# Patient Record
Sex: FEMALE | Race: WHITE | NOT HISPANIC OR LATINO | Employment: UNEMPLOYED | URBAN - METROPOLITAN AREA
[De-identification: names, ages, dates, MRNs, and addresses within clinical notes are randomized per-mention and may not be internally consistent; named-entity substitution may affect disease eponyms.]

---
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
## 2017-09-12 ENCOUNTER — TRANSCRIBE ORDERS (OUTPATIENT)
Dept: ADMINISTRATIVE | Facility: HOSPITAL | Age: 22
End: 2017-09-12

## 2017-09-12 DIAGNOSIS — Z3A.19 19 WEEKS GESTATION OF PREGNANCY: Primary | ICD-10-CM

## 2017-09-28 ENCOUNTER — GENERIC CONVERSION - ENCOUNTER (OUTPATIENT)
Dept: OTHER | Facility: OTHER | Age: 22
End: 2017-09-28

## 2017-10-05 ENCOUNTER — GENERIC CONVERSION - ENCOUNTER (OUTPATIENT)
Dept: OTHER | Facility: OTHER | Age: 22
End: 2017-10-05

## 2017-10-06 ENCOUNTER — ALLSCRIPTS OFFICE VISIT (OUTPATIENT)
Dept: PERINATAL CARE | Facility: CLINIC | Age: 22
End: 2017-10-06
Payer: COMMERCIAL

## 2017-10-06 ENCOUNTER — GENERIC CONVERSION - ENCOUNTER (OUTPATIENT)
Dept: OTHER | Facility: OTHER | Age: 22
End: 2017-10-06

## 2017-10-06 PROCEDURE — 76805 OB US >/= 14 WKS SNGL FETUS: CPT | Performed by: OBSTETRICS & GYNECOLOGY

## 2017-10-06 PROCEDURE — 76817 TRANSVAGINAL US OBSTETRIC: CPT | Performed by: OBSTETRICS & GYNECOLOGY

## 2017-11-30 ENCOUNTER — APPOINTMENT (OUTPATIENT)
Dept: LAB | Facility: CLINIC | Age: 22
End: 2017-11-30

## 2017-11-30 ENCOUNTER — TRANSCRIBE ORDERS (OUTPATIENT)
Dept: LAB | Facility: CLINIC | Age: 22
End: 2017-11-30

## 2017-11-30 DIAGNOSIS — R69 DIAGNOSIS UNKNOWN: ICD-10-CM

## 2017-11-30 DIAGNOSIS — R69 DIAGNOSIS UNKNOWN: Primary | ICD-10-CM

## 2017-11-30 LAB
BASOPHILS # BLD AUTO: 0.02 THOUSANDS/ΜL (ref 0–0.1)
BASOPHILS NFR BLD AUTO: 0 % (ref 0–1)
EOSINOPHIL # BLD AUTO: 0.09 THOUSAND/ΜL (ref 0–0.61)
EOSINOPHIL NFR BLD AUTO: 1 % (ref 0–6)
ERYTHROCYTE [DISTWIDTH] IN BLOOD BY AUTOMATED COUNT: 12.9 % (ref 11.6–15.1)
GLUCOSE 1H P 50 G GLC PO SERPL-MCNC: 109 MG/DL
HCT VFR BLD AUTO: 31.4 % (ref 34.8–46.1)
HGB BLD-MCNC: 10.9 G/DL (ref 11.5–15.4)
LYMPHOCYTES # BLD AUTO: 1.61 THOUSANDS/ΜL (ref 0.6–4.47)
LYMPHOCYTES NFR BLD AUTO: 22 % (ref 14–44)
MCH RBC QN AUTO: 31.6 PG (ref 26.8–34.3)
MCHC RBC AUTO-ENTMCNC: 34.7 G/DL (ref 31.4–37.4)
MCV RBC AUTO: 91 FL (ref 82–98)
MONOCYTES # BLD AUTO: 0.35 THOUSAND/ΜL (ref 0.17–1.22)
MONOCYTES NFR BLD AUTO: 5 % (ref 4–12)
NEUTROPHILS # BLD AUTO: 5.17 THOUSANDS/ΜL (ref 1.85–7.62)
NEUTS SEG NFR BLD AUTO: 72 % (ref 43–75)
NRBC BLD AUTO-RTO: 0 /100 WBCS
PLATELET # BLD AUTO: 189 THOUSANDS/UL (ref 149–390)
PMV BLD AUTO: 9.3 FL (ref 8.9–12.7)
RBC # BLD AUTO: 3.45 MILLION/UL (ref 3.81–5.12)
WBC # BLD AUTO: 7.26 THOUSAND/UL (ref 4.31–10.16)

## 2017-11-30 PROCEDURE — 36415 COLL VENOUS BLD VENIPUNCTURE: CPT

## 2017-11-30 PROCEDURE — 85025 COMPLETE CBC W/AUTO DIFF WBC: CPT

## 2017-11-30 PROCEDURE — 82950 GLUCOSE TEST: CPT

## 2018-01-12 NOTE — PROGRESS NOTES
OCT 6 2017         RE: Lisa Robert                                To: TITI Hare    MR#: [id number]                                    [address]   : [address]   ENC: [id number]                             Fax: [phone]   (Exam #: [id number])      The LMP of this 20year old,  G1, P0-0-0-0 patient was MAY 21 2017, giving   her an WILIAM of 2018 and a current gestational age of 24 weeks 5 days   by dates  A sonographic examination was performed on OCT 6 2017 using real   time equipment  The ultrasound examination was performed using abdominal &   vaginal techniques  The patient has a BMI of 24 4  Her blood pressure   today was 115/63  Thank you for your kind referral of Lsia Robert to the [facility], [facility]    in [place] on 2017 for level II ultrasound evaluation and MFM   assessment  Greg Santos is a 58-year-old primigravida who is currently at   19-5/7 weeks gestation by an estimated due date of 2018 which   is based upon menstrual dating  Her prenatal course so far has been   unremarkable  Greg Santos has no complaints  She reports fetal movement and   denies vaginal bleeding  She  declined genetic screening earlier in the   pregnancy has not yet received the influenza vaccine  Her past medical and   surgical histories are unremarkable  She currently takes no medication   with the exception of a prenatal vitamin on a daily basis and has no known   drug allergy  She denies tobacco, alcohol, or illicit drug use during the   pregnancy  The family medical history is negative with respect to first   degree relatives with diabetes, hypertension, or venous thromboembolism  The family genetic history is negative with respect to genetic   abnormalities, birth defects, or mental retardation        Cardiac motion was observed at 150 bpm       INDICATIONS      fetal anatomical survey      Exam Types Transvaginal   LEVEL II      RESULTS      Fetus # 1 of 1   Vertex presentation   Fetal growth appeared normal   Placenta Location = Anterior   No placenta previa   Placenta Grade = I      MEASUREMENTS (* Included In Average GA)      AC              15 0 cm        20 weeks 0 days* (57%)   BPD              5 1 cm        21 weeks 3 days* (92%)   HC              18 5 cm        20 weeks 5 days* (76%)   Femur            3 2 cm        20 weeks 1 day * (49%)      Nuchal Fold      3 8 mm   NBL              7 7 mm      Humerus          2 9 cm        19 weeks 4 days  (50%)      Cerebellum       2 1 cm        21 weeks 0 days   Biorbit          3 2 cm        20 weeks 4 days   CisternaMagna    5 4 mm      HC/AC           1 23   FL/AC           0 21   FL/BPD          0 63   EFW (Ac/Fl/Hc)   339 grams - 0 lbs 12 oz      THE AVERAGE GESTATIONAL AGE is 20 weeks 4 days +/- 10 days  AMNIOTIC FLUID         Largest Vertical Pocket = 391 0 cm   Amniotic Fluid: Normal      CERVICAL EVALUATION      The cervix appeared normal (Ultrasound Examination)  SUPINE      Cervical Length: 3 50 cm      OTHER TEST RESULTS           Funneling?: No             Dynamic Changes?: No        Resp  To TFP?: No                      Debris?: No      ANATOMY      Head                                    Normal   Face/Neck                               Normal   Th  Cav  Normal   Heart                                   Normal   Abd  Cav  Normal   Stomach                                 Normal   Right Kidney                            Normal   Left Kidney                             Normal   Bladder                                 Normal   Abd  Wall                               Normal   Spine                                   Normal   Extrems                                 Normal   Genitalia                               Normal   Placenta                                Normal   Umbl   Cord Normal   Uterus                                  Normal   PCI                                     Normal      ANATOMY DETAILS      Visualized Appearing Sonographically Normal:   HEAD: (Calvarium, BPD Level, Cavum, Lateral Ventricles, Choroid Plexus,   Cerebellum, Cisterna Magna);    FACE/NECK: (Neck, Nuchal Fold, Profile,   Orbits, Nose/Lips, Palate, Face);    TH  CAV  : (Lungs, Diaphragm); HEART: (Four Chamber View, Proximal Left Outflow, Proximal Right Outflow,   3VV, 3 Vessel Trachea, Short Axis of Greater Vessels, Ductal Arch, Aortic   Arch, Interventricular Septum, Interatrial Septum, IVC, SVC, Cardiac Axis,   Cardiac Position);    ABD  CAV : (Liver, Gall Bladder);    STOMACH, RIGHT   KIDNEY, LEFT KIDNEY, BLADDER, ABD  WALL, SPINE: (Cervical Spine, Thoracic   Spine, Lumbar Spine, Sacrum);    EXTREMS: (Lt Humerus, Rt Humerus, Lt   Forearm, Rt Forearm, Lt Hand, Rt Hand, Lt Femur, Rt Femur, Lt Low Leg, Rt   Low Leg, Lt Foot, Rt Foot);    GENITALIA (Male), PLACENTA, UMBL  CORD,   UTERUS, PCI      ADNEXA      The left ovary appeared normal and measured 3 5 x 2 4 x 2 0 cm with a   volume of 8 8 cc  The right ovary appeared normal and measured 2 5 x 3 3 x   1 6 cm with a volume of 6 9 cc  IMPRESSION      Treadwell IUP   20 weeks and 4 days by this ultrasound  (WILIAM=FEB 19 2018)   Vertex presentation   Fetal growth appeared normal   Normal anatomy survey   Regular fetal heart rate of 150 bpm   Anterior placenta   No placenta previa      GENERAL COMMENT      No fetal structural abnormality or ultrasound marker for aneuploidy is   identified on the Level II ultrasound study today  Fetal growth and   amniotic fluid volume are normal   The placenta is normal in appearance  The cervix is normal in appearance by transvaginal sonography  The   cervical length is normal   Cervical debris is not present  Cervical   funneling is not present    Neither provocative nor dynamic change is appreciated  Today's ultrasound findings and suggested follow-up were discussed in   detail with Macey Meza  We discussed that prenatal ultrasound cannot rule out   all congenital abnormalities  No further appointment has been scheduled in   the [facility]  for Macey Meza at this time  Follow-up [facility] ultrasound   evaluation is recommended as clinically indicated  We also discussed the   importance of receiving the influenza vaccine during pregnancy  The face to face time, in addition to time spent discussing ultrasound   results, was approximately 10 minutes, greater than 50% of which was spent   during counseling and coordination of care  MADISON Roth M D     Maternal-Fetal Medicine   Electronically signed 10/07/17 11:43

## 2018-01-13 VITALS
BODY MASS INDEX: 24.33 KG/M2 | SYSTOLIC BLOOD PRESSURE: 115 MMHG | HEIGHT: 67 IN | DIASTOLIC BLOOD PRESSURE: 63 MMHG | WEIGHT: 155.04 LBS

## 2018-01-13 NOTE — CONSULTS
I had the pleasure of evaluating your patient, Alhaji Dobson  My full evaluation follows:      Chief Complaint  Here for ultrasound study      History of Present Illness  Please refer to the ultrasound report for additional information  Past Medical History    · History of Known health problems: none (V49 89) (Z78 9)    Family History    · No pertinent family history    Social History    · Never a smoker    Current Meds   1  Prenatal TABS; Therapy: (Recorded:06Oct2017) to Recorded    Allergies    1  No Known Drug Allergies    2  Other    Vitals   Recorded: [id number] 62:23LZ   Systolic 932, LUE, Sitting   Diastolic 63, LUE, Sitting   Height 5 ft 7 in   Weight 155 lb 0 6 oz   BMI Calculated 24 28   BSA Calculated 1 81   Pain Scale 0     Results/Data  Exam description: level II obstetrical ultrasound, transvaginal obstetrical ultrasound  Findings: Please refer to the ultrasound report for additional information  Discussion/Summary    Please refer to the ultrasound report for additional information  The patient was counseled regarding diagnostic results, instructions for management, prognosis, impressions  Thank you very much for allowing me to participate in the care of this patient  If you have any questions, please do not hesitate to contact me        Signatures   Electronically signed by : TITI Paz ; Oct  7 2017 11:40AM EST                       (Author)

## 2018-01-29 ENCOUNTER — LAB REQUISITION (OUTPATIENT)
Dept: LAB | Facility: HOSPITAL | Age: 23
End: 2018-01-29
Payer: COMMERCIAL

## 2018-01-29 DIAGNOSIS — Z36.89 ENCOUNTER FOR OTHER SPECIFIED ANTENATAL SCREENING (CODE): ICD-10-CM

## 2018-01-29 DIAGNOSIS — Z36.85 ENCOUNTER FOR ANTENATAL SCREENING FOR STREPTOCOCCUS B: ICD-10-CM

## 2018-01-29 PROCEDURE — 87653 STREP B DNA AMP PROBE: CPT | Performed by: OBSTETRICS & GYNECOLOGY

## 2018-01-31 LAB — GP B STREP DNA SPEC QL NAA+PROBE: NORMAL

## 2018-03-04 ENCOUNTER — HOSPITAL ENCOUNTER (INPATIENT)
Facility: HOSPITAL | Age: 23
LOS: 2 days | Discharge: HOME | End: 2018-03-06
Attending: OBSTETRICS & GYNECOLOGY | Admitting: OBSTETRICS & GYNECOLOGY
Payer: COMMERCIAL

## 2018-03-04 ENCOUNTER — ANESTHESIA EVENT (INPATIENT)
Dept: LABOR AND DELIVERY | Facility: HOSPITAL | Age: 23
End: 2018-03-04
Payer: COMMERCIAL

## 2018-03-04 ENCOUNTER — ANESTHESIA (INPATIENT)
Dept: LABOR AND DELIVERY | Facility: HOSPITAL | Age: 23
End: 2018-03-04
Payer: COMMERCIAL

## 2018-03-04 DIAGNOSIS — Z3A.41 41 WEEKS GESTATION OF PREGNANCY: Primary | ICD-10-CM

## 2018-03-04 PROBLEM — O48.0 41 WEEKS GESTATION OF PREGNANCY: Status: ACTIVE | Noted: 2018-03-04

## 2018-03-04 LAB
ABO GROUP BLD: NORMAL
BASOPHILS # BLD AUTO: 0.01 THOUSANDS/ΜL (ref 0–0.1)
BASOPHILS NFR BLD AUTO: 0 % (ref 0–1)
BLD GP AB SCN SERPL QL: NEGATIVE
EOSINOPHIL # BLD AUTO: 0 THOUSAND/ΜL (ref 0–0.61)
EOSINOPHIL NFR BLD AUTO: 0 % (ref 0–6)
ERYTHROCYTE [DISTWIDTH] IN BLOOD BY AUTOMATED COUNT: 14.3 % (ref 11.6–15.1)
HCT VFR BLD AUTO: 32.3 % (ref 34.8–46.1)
HGB BLD-MCNC: 11.2 G/DL (ref 11.5–15.4)
LYMPHOCYTES # BLD AUTO: 1.09 THOUSANDS/ΜL (ref 0.6–4.47)
LYMPHOCYTES NFR BLD AUTO: 11 % (ref 14–44)
MCH RBC QN AUTO: 29.7 PG (ref 26.8–34.3)
MCHC RBC AUTO-ENTMCNC: 34.7 G/DL (ref 31.4–37.4)
MCV RBC AUTO: 86 FL (ref 82–98)
MONOCYTES # BLD AUTO: 0.33 THOUSAND/ΜL (ref 0.17–1.22)
MONOCYTES NFR BLD AUTO: 3 % (ref 4–12)
NEUTROPHILS # BLD AUTO: 8.42 THOUSANDS/ΜL (ref 1.85–7.62)
NEUTS SEG NFR BLD AUTO: 86 % (ref 43–75)
NRBC BLD AUTO-RTO: 0 /100 WBCS
PLATELET # BLD AUTO: 146 THOUSANDS/UL (ref 149–390)
PMV BLD AUTO: 9.6 FL (ref 8.9–12.7)
RBC # BLD AUTO: 3.77 MILLION/UL (ref 3.81–5.12)
RH BLD: POSITIVE
SPECIMEN EXPIRATION DATE: NORMAL
WBC # BLD AUTO: 9.87 THOUSAND/UL (ref 4.31–10.16)

## 2018-03-04 PROCEDURE — 86901 BLOOD TYPING SEROLOGIC RH(D): CPT | Performed by: OBSTETRICS & GYNECOLOGY

## 2018-03-04 PROCEDURE — 99214 OFFICE O/P EST MOD 30 MIN: CPT

## 2018-03-04 PROCEDURE — 86592 SYPHILIS TEST NON-TREP QUAL: CPT | Performed by: OBSTETRICS & GYNECOLOGY

## 2018-03-04 PROCEDURE — G0463 HOSPITAL OUTPT CLINIC VISIT: HCPCS

## 2018-03-04 PROCEDURE — 4A1HXCZ MONITORING OF PRODUCTS OF CONCEPTION, CARDIAC RATE, EXTERNAL APPROACH: ICD-10-PCS | Performed by: OBSTETRICS & GYNECOLOGY

## 2018-03-04 PROCEDURE — 3E033VJ INTRODUCTION OF OTHER HORMONE INTO PERIPHERAL VEIN, PERCUTANEOUS APPROACH: ICD-10-PCS | Performed by: OBSTETRICS & GYNECOLOGY

## 2018-03-04 PROCEDURE — 86900 BLOOD TYPING SEROLOGIC ABO: CPT | Performed by: OBSTETRICS & GYNECOLOGY

## 2018-03-04 PROCEDURE — 86850 RBC ANTIBODY SCREEN: CPT | Performed by: OBSTETRICS & GYNECOLOGY

## 2018-03-04 PROCEDURE — 85025 COMPLETE CBC W/AUTO DIFF WBC: CPT | Performed by: OBSTETRICS & GYNECOLOGY

## 2018-03-04 PROCEDURE — 10907ZC DRAINAGE OF AMNIOTIC FLUID, THERAPEUTIC FROM PRODUCTS OF CONCEPTION, VIA NATURAL OR ARTIFICIAL OPENING: ICD-10-PCS | Performed by: OBSTETRICS & GYNECOLOGY

## 2018-03-04 RX ORDER — ROPIVACAINE HYDROCHLORIDE 2 MG/ML
INJECTION, SOLUTION EPIDURAL; INFILTRATION; PERINEURAL AS NEEDED
Status: DISCONTINUED | OUTPATIENT
Start: 2018-03-04 | End: 2018-03-05 | Stop reason: SURG

## 2018-03-04 RX ORDER — LIDOCAINE HYDROCHLORIDE AND EPINEPHRINE 15; 5 MG/ML; UG/ML
INJECTION, SOLUTION EPIDURAL AS NEEDED
Status: DISCONTINUED | OUTPATIENT
Start: 2018-03-04 | End: 2018-03-05 | Stop reason: SURG

## 2018-03-04 RX ORDER — OXYTOCIN/RINGER'S LACTATE 30/500 ML
1-30 PLASTIC BAG, INJECTION (ML) INTRAVENOUS
Status: DISCONTINUED | OUTPATIENT
Start: 2018-03-04 | End: 2018-03-05

## 2018-03-04 RX ORDER — SODIUM CHLORIDE, SODIUM LACTATE, POTASSIUM CHLORIDE, CALCIUM CHLORIDE 600; 310; 30; 20 MG/100ML; MG/100ML; MG/100ML; MG/100ML
125 INJECTION, SOLUTION INTRAVENOUS CONTINUOUS
Status: DISCONTINUED | OUTPATIENT
Start: 2018-03-04 | End: 2018-03-05

## 2018-03-04 RX ADMIN — ROPIVACAINE HYDROCHLORIDE 5 ML: 2 INJECTION, SOLUTION EPIDURAL; INFILTRATION at 21:40

## 2018-03-04 RX ADMIN — SODIUM CHLORIDE, SODIUM LACTATE, POTASSIUM CHLORIDE, AND CALCIUM CHLORIDE 125 ML/HR: .6; .31; .03; .02 INJECTION, SOLUTION INTRAVENOUS at 20:15

## 2018-03-04 RX ADMIN — ROPIVACAINE HYDROCHLORIDE 5 ML: 2 INJECTION, SOLUTION EPIDURAL; INFILTRATION at 21:35

## 2018-03-04 RX ADMIN — LIDOCAINE HYDROCHLORIDE AND EPINEPHRINE 5 ML: 15; 5 INJECTION, SOLUTION EPIDURAL at 21:32

## 2018-03-04 RX ADMIN — Medication 2 MILLI-UNITS/MIN: at 22:44

## 2018-03-04 RX ADMIN — SODIUM CHLORIDE, SODIUM LACTATE, POTASSIUM CHLORIDE, AND CALCIUM CHLORIDE 125 ML/HR: .6; .31; .03; .02 INJECTION, SOLUTION INTRAVENOUS at 18:00

## 2018-03-04 RX ADMIN — ROPIVACAINE HYDROCHLORIDE: 2 INJECTION, SOLUTION EPIDURAL; INFILTRATION at 21:45

## 2018-03-04 NOTE — H&P
History & Physical - OB/GYN   Clive Rodriguez 20 y o  female MRN: [id number]  Unit/Bed#: LD Triage  Encounter: [id number]        She is a patient of Dr Fanny Wells    Chief complaint:  Oconto Herrera for late term    HPI: Clive Rodriguez is a 20 y o   at 37w0d by LMP with WILIAM 18 who is being admitted for elective induction of labor at term  Patient initially presented to triage for rule out labor, after 2 rechecks patient made a small amount of cervical change from 2-3 cm  She was offered to stay for induction of labor or to follow up with her OB tomorrow  Patient desired induction given intensity of contractions  Contractions:  Yes   Fetal movement:  yes  Vaginal bleeding:   no  Leaking of fluid:  no      Pregnancy Complications:  None    PMH:  Past Medical History:   Diagnosis Date    Varicella     childhood       PSH:  No past surgical history on file  Social Hx:  Denies tobacco, alcohol, and drug use    OB Hx:  Obstetric History       T0      L0     SAB0   TAB0   Ectopic0   Multiple0   Live Births0       # Outcome Date GA Lbr Chau/2nd Weight Sex Delivery Anes PTL Lv   1 Current                   Meds:  No current facility-administered medications on file prior to encounter  Current Outpatient Prescriptions on File Prior to Encounter   Medication Sig Dispense Refill    Cyanocobalamin (VITAMIN B 12 PO) Take 1 tablet by mouth daily      ferrous sulfate 325 (65 Fe) mg tablet Take 325 mg by mouth daily with breakfast      folic acid (FOLVITE) 1 mg tablet Take 1 mg by mouth daily      Prenatal Vit-Fe Fumarate-FA (PRENATAL VITAMIN PO) Take 1 tablet by mouth daily      [DISCONTINUED] cholecalciferol (VITAMIN D3) 1,000 units tablet Take 1,000 Units by mouth daily         Allergies:   Allergies   Allergen Reactions    Cashew Nut Oil Hives    Pistachio Nut (Diagnostic) Hives       Labs:  Blood type: A+  Antibody: negative  Group B strep: negative  HIV: negative  Hepatitis B: negative  RPR: NR  Rubella: Immune  Varicella Immune  1 hour Glucose: 109    Physical Exam:  /69 (BP Location: Right arm)   Pulse 83   Temp 97 9 °F (36 6 °C) (Tympanic)   Resp 18   LMP 2017   SpO2 98%     Physical Exam   Constitutional: She is oriented to person, place, and time  She appears well-developed and well-nourished  HENT:   Head: Normocephalic and atraumatic  Cardiovascular: Normal rate and regular rhythm  Pulmonary/Chest: Effort normal  No respiratory distress  She exhibits no tenderness  Abdominal: Soft  She exhibits no distension  There is no tenderness  There is no rebound and no guarding  Neurological: She is alert and oriented to person, place, and time  Skin: Skin is warm and dry  Estimated Fetal Weight: 7 5 lbs  Presentation: vertex    SVE: 3 / 70% / -2  FHT:  130 / Moderate 6 - 25 bpm / Cat I, Reactive  Mayo: q 2-3 min    Membranes: Intact    Assessment:   20 y o   at 41w0d admitted for eIOL for late term  Plan:   1  Admit to L&D  2  CBC, RPR, type and screen  3  AROM & Pitocin  Epidural upon request    Discussed with Dr Rebecca Drummond

## 2018-03-05 LAB
BASE EXCESS BLDCOA CALC-SCNC: -3.6 MMOL/L (ref 3–11)
BASE EXCESS BLDCOV CALC-SCNC: -1.8 MMOL/L (ref 1–9)
HCO3 BLDCOA-SCNC: 24.2 MMOL/L (ref 17.3–27.3)
HCO3 BLDCOV-SCNC: 22 MMOL/L (ref 12.2–28.6)
O2 CT VFR BLDCOA CALC: 3.3 ML/DL
OXYHGB MFR BLDCOA: 14.8 %
OXYHGB MFR BLDCOV: 74.1 %
PCO2 BLDCOA: 54.5 MM[HG] (ref 30–60)
PCO2 BLDCOV: 35.1 MM HG (ref 27–43)
PH BLDCOA: 7.27 [PH] (ref 7.23–7.43)
PH BLDCOV: 7.41 [PH] (ref 7.19–7.49)
PO2 BLDCOA: 11.4 MM HG (ref 5–25)
PO2 BLDCOV: 30.1 MM HG (ref 15–45)
RPR SER QL: NORMAL
SAO2 % BLDCOV: 17.2 ML/DL

## 2018-03-05 PROCEDURE — 0KQM0ZZ REPAIR PERINEUM MUSCLE, OPEN APPROACH: ICD-10-PCS | Performed by: OBSTETRICS & GYNECOLOGY

## 2018-03-05 PROCEDURE — 82805 BLOOD GASES W/O2 SATURATION: CPT | Performed by: OBSTETRICS & GYNECOLOGY

## 2018-03-05 PROCEDURE — 0UQGXZZ REPAIR VAGINA, EXTERNAL APPROACH: ICD-10-PCS | Performed by: OBSTETRICS & GYNECOLOGY

## 2018-03-05 RX ORDER — LIDOCAINE HYDROCHLORIDE 10 MG/ML
INJECTION, SOLUTION EPIDURAL; INFILTRATION; INTRACAUDAL; PERINEURAL
Status: COMPLETED
Start: 2018-03-05 | End: 2018-03-05

## 2018-03-05 RX ORDER — DIAPER,BRIEF,INFANT-TODD,DISP
1 EACH MISCELLANEOUS AS NEEDED
Status: DISCONTINUED | OUTPATIENT
Start: 2018-03-05 | End: 2018-03-06 | Stop reason: HOSPADM

## 2018-03-05 RX ORDER — IBUPROFEN 600 MG/1
600 TABLET ORAL EVERY 6 HOURS PRN
Status: DISCONTINUED | OUTPATIENT
Start: 2018-03-05 | End: 2018-03-06 | Stop reason: HOSPADM

## 2018-03-05 RX ORDER — OXYCODONE HYDROCHLORIDE AND ACETAMINOPHEN 5; 325 MG/1; MG/1
1 TABLET ORAL EVERY 4 HOURS PRN
Status: DISCONTINUED | OUTPATIENT
Start: 2018-03-05 | End: 2018-03-06 | Stop reason: HOSPADM

## 2018-03-05 RX ORDER — ACETAMINOPHEN 325 MG/1
650 TABLET ORAL EVERY 4 HOURS PRN
Status: DISCONTINUED | OUTPATIENT
Start: 2018-03-05 | End: 2018-03-06 | Stop reason: HOSPADM

## 2018-03-05 RX ORDER — OXYTOCIN/RINGER'S LACTATE 30/500 ML
62.5 PLASTIC BAG, INJECTION (ML) INTRAVENOUS CONTINUOUS
Status: ACTIVE | OUTPATIENT
Start: 2018-03-05 | End: 2018-03-05

## 2018-03-05 RX ORDER — DOCUSATE SODIUM 100 MG/1
100 CAPSULE, LIQUID FILLED ORAL 2 TIMES DAILY
Status: DISCONTINUED | OUTPATIENT
Start: 2018-03-05 | End: 2018-03-06 | Stop reason: HOSPADM

## 2018-03-05 RX ORDER — METHYLERGONOVINE MALEATE 0.2 MG/ML
0.2 INJECTION INTRAVENOUS ONCE
Status: COMPLETED | OUTPATIENT
Start: 2018-03-05 | End: 2018-03-05

## 2018-03-05 RX ORDER — OXYCODONE HYDROCHLORIDE AND ACETAMINOPHEN 5; 325 MG/1; MG/1
2 TABLET ORAL EVERY 4 HOURS PRN
Status: DISCONTINUED | OUTPATIENT
Start: 2018-03-05 | End: 2018-03-06 | Stop reason: HOSPADM

## 2018-03-05 RX ORDER — SENNOSIDES 8.6 MG
1 TABLET ORAL
Status: DISCONTINUED | OUTPATIENT
Start: 2018-03-05 | End: 2018-03-06 | Stop reason: HOSPADM

## 2018-03-05 RX ORDER — SIMETHICONE 80 MG
80 TABLET,CHEWABLE ORAL 4 TIMES DAILY PRN
Status: DISCONTINUED | OUTPATIENT
Start: 2018-03-05 | End: 2018-03-06 | Stop reason: HOSPADM

## 2018-03-05 RX ORDER — OXYTOCIN/RINGER'S LACTATE 30/500 ML
250 PLASTIC BAG, INJECTION (ML) INTRAVENOUS CONTINUOUS
Status: ACTIVE | OUTPATIENT
Start: 2018-03-05 | End: 2018-03-05

## 2018-03-05 RX ADMIN — DOCUSATE SODIUM 100 MG: 100 CAPSULE, LIQUID FILLED ORAL at 09:49

## 2018-03-05 RX ADMIN — LIDOCAINE HYDROCHLORIDE 300 MG: 10 INJECTION, SOLUTION EPIDURAL; INFILTRATION; INTRACAUDAL; PERINEURAL at 06:41

## 2018-03-05 RX ADMIN — METHYLERGONOVINE MALEATE 0.2 MG: 0.2 INJECTION, SOLUTION INTRAMUSCULAR; INTRAVENOUS at 06:28

## 2018-03-05 RX ADMIN — WITCH HAZEL 1 PAD: 500 SOLUTION RECTAL; TOPICAL at 08:16

## 2018-03-05 RX ADMIN — SODIUM CHLORIDE, SODIUM LACTATE, POTASSIUM CHLORIDE, AND CALCIUM CHLORIDE 125 ML/HR: .6; .31; .03; .02 INJECTION, SOLUTION INTRAVENOUS at 04:01

## 2018-03-05 RX ADMIN — Medication 62.5 MILLI-UNITS/MIN: at 06:50

## 2018-03-05 RX ADMIN — IBUPROFEN 600 MG: 600 TABLET ORAL at 09:49

## 2018-03-05 RX ADMIN — Medication 1 TABLET: at 09:49

## 2018-03-05 RX ADMIN — BENZOCAINE AND MENTHOL: 20; .5 SPRAY TOPICAL at 08:16

## 2018-03-05 RX ADMIN — HYDROCORTISONE 1 APPLICATION: 1 CREAM TOPICAL at 08:16

## 2018-03-05 NOTE — L&D DELIVERY NOTE
Delivery Summary - OB/GYN   Félix Estes 20 y o  female MRN: [id number]  Unit/Bed#: -01 Encounter: [id number]    Pre-delivery Diagnosis:   1  41w1d pregnancy  2  Induced labor  3  GBS negative    Post-delivery Diagnosis: same, delivered    Attending: Dr Lali Scott    Assistant(s): Dr Steffen Walker    Procedure:     Anesthesia: Epidural    Estimated Blood Loss:  500 mL    Specimens:   1  Arterial and venous cord gases  2  Cord blood  3  Segment of umbilical cord  4  Placenta to storage     Complications:  None apparent    Findings:  1  Viable male  delivered at 0610 weighing 8lbs 10oz; Apgar scores of 7 at one minute and 8 at five minutes  2  Spontaneous delivery of placenta with centrally inserted 3-vessel cord  3  Clear amniotic fluid  4  2nd degree perineal laceration, repaired with 3-0 Vicryl rapide  5  Bilateral shallow vaginal lacerations located at the hymenal ring, repaired with 3-0 Vicryl rapide  6  Arterial cord pH 7 266, base excess -3 6  7  Venous cord pH 7 414, base excess -1 8       Disposition: Patient tolerated the procedure well and was recovering in labor and delivery room with family and  before being transferred to the post-partum floor  Procedure Details   Description of procedure  Félix Estes presented to labor and delivery with concern for labor  She was uncomfortable and requested induction of labor for late term  She underwent pitocin titration  She underwent amniotomy  She made slow and steady change until reaching complete at 0500 and pushing at 0505  After pushing for 1 hour 5 minutes, at 0610 patient delivered a viable male , weighing 8lbs 10oz, Apgars of 7 (1 min) and 8 (5 min)  The fetal vertex delivered spontaneously  There was a loose nuchal cord that was easily reduced at the perineum  The anterior shoulder delivered atraumatically with maternal expulsive forces and the assistance of downward traction   The posterior shoulder delivered with maternal expulsive forces and the assistance of upward traction  The remainder of the fetus delivered spontaneously  Upon delivery, the infant was placed on the mothers abdomen and the cord was clamped and cut  The infant was noted to cry spontaneously and was moving all extremities appropriately  There was no evidence for injury  Awaiting nurse resuscitators evaluated the  at bedside  Arterial and venous cord blood gases and cord blood was collected for analysis  These were promptly sent to the lab  In the immediate post-partum, there was noted to be lower uterine segment atony  She received 30 units of IV pitocin and the uterus was noted to contract down well with massage and pitocin  The uterus then became atonic and she also received 1 IM injection of methergine 0 2mg  Uterine bleeding was then found to be appropriate  The placenta delivered spontaneously at 0615 and was noted to have a centrally inserted 3 vessel cord  The vagina, cervix, and perineum were inspected and there was noted to be a second degree perineal laceration and bilateral vaginal lacerations  Laceration Repair  Patient was comfortable with epidural at that time  A second degree perineal laceration was identified and required repair  Laceration was repaired with 3-0 Vicryl rapide in single running suture to reapproximate the laceration  Towards the completion of the second degree perineal laceration, pt was found to be uncomfortable with suturing  Decision was made to administer lidocaine to the remaining vaginal lacerations  Good analgesia was appreciated and pt received a single figure of eight suture of 3-0 Vicryl rapide at the right vaginal laceration and a single interrupted suture of 3-0 Vicryl rapide at the left vaginal laceration  Good hemostasis was confirmed at the conclusion of this procedure  At the conclusion of the delivery, all needle, sponge, and instrument counts were noted to be correct  Patient tolerated the procedure well and was allowed to recover in labor and delivery room with family and  before being transferred to the post-partum floor  Dr Nixon Knapp was present and participated in all key portions of the case          Endy Wise DO  OB/GYN, PGY2  3/5/2018, 7:59 AM

## 2018-03-05 NOTE — OB LABOR/OXYTOCIN SAFETY PROGRESS
Labor Progress Note - Kalpana Hatfield 20 y o  female MRN: [id number]    Unit/Bed#: -01 Encounter: [id number]    Obstetric History       T0      L0     SAB0   TAB0   Ectopic0   Multiple0   Live Births0      Gestational Age: 41w0d     Contraction Frequency (minutes): 4-6  Contraction Quality: Mild, Moderate  Tachysystole: No   Dilation: 3        Effacement (%): 90  Station: -2  Baseline Rate: 135 bpm  Fetal Heart Rate: 140 BPM  FHR Category: Category I          Notes/comments:   Pt states contractions are getting stronger    AROM thin meconium fluid  Making small amounts of cervical change  FHT Cat I, cont to monitor  Recheck in 1-2hr, sooner if uncomfortable    Discussed with Dr Dorean Soulier, DO 3/4/2018 7:17 PM

## 2018-03-05 NOTE — OB LABOR/OXYTOCIN SAFETY PROGRESS
Labor Progress Note - Kristi Cooler 20 y o  female MRN: [id number]    Unit/Bed#: -01 Encounter: [id number]    Obstetric History       T0      L0     SAB0   TAB0   Ectopic0   Multiple0   Live Births0      Gestational Age: 41w0d     Contraction Frequency (minutes): 3-5  Contraction Quality: Moderate  Tachysystole: No   Dilation: 3        Effacement (%): 90  Station: -2  Baseline Rate: 140 bpm  Fetal Heart Rate: 140 BPM  FHR Category: Category I          Notes/comments:   Pt comfortable now with epidural  No cervical change from previous examination  FHT Cat I, cont to monitor  Discussed pitocin titration with pt, including risks, benefits, and alternatives  Pt agreeable for pitocin at this time    Recheck in 1-2hr, sooner if uncomfortable    Discussed with Dr Tre Perez DO 3/4/2018 10:14 PM

## 2018-03-05 NOTE — OB LABOR/OXYTOCIN SAFETY PROGRESS
Oxytocin Safety Progress Check Note - Rebecca Verde 20 y o  female MRN: [id number]    Unit/Bed#: -01 Encounter: [id number]    Obstetric History       T0      L0     SAB0   TAB0   Ectopic0   Multiple0   Live Births0      Gestational Age: 41w1d  Dose (ector-units/min) Oxytocin: 6 ector-units/min  Contraction Frequency (minutes): 2-3  Contraction Quality: Moderate, Strong  Tachysystole: No   Dilation: Lip/rim (Comment)        Effacement (%): 100  Station: 1  Baseline Rate: 140 bpm  Fetal Heart Rate: 140 BPM  FHR Category: Category II          Notes/comments:   Pt comfortable, feeling intermittent pressure  Making small amounts of cervical change  Recommend peanut ball; unclear if one is currently available but will attempt to find one  FHT Cat II with rare variable decelerations  These are not recurrent  Will cont to monitor  Recheck in 1-2hr, sooner if uncomfortable      Dr Hitesh Sarkar aware and en route          Orquidea Gonzalez, DO 3/5/2018 4:03 AM

## 2018-03-05 NOTE — ANESTHESIA POSTPROCEDURE EVALUATION
Post-Op Assessment Note          Staff: Anesthesiologist       Comments: pt resting in bed, comfortable, epidural out, tip intact    Post-op block assessment: catheter intact        BP      Temp      Pulse     Resp      SpO2

## 2018-03-05 NOTE — DISCHARGE SUMMARY
Discharge Summary -   Jacob Samuels 20 y o  female MRN: [id number]  Unit/Bed#: -01 Encounter: [id number]    Admission Date: 3/4/2018     Discharge Date: 3/6/18    Discharging Attending: Dr Marty Huang    Principal Diagnosis:   Late term pregnancy at 41w1d  Induced labor  Single fetus  Uncomplicated pregnancy    Secondary Diagnosis:   Same as above, delivered    Procedures: Spontaneous vaginal delivery    Hospital Course:   Jacob Samuels is a 20 y o  Latia Boatman at 41w1d who was initially admitted for late term induction of labor on 3/4/18  She underwent pitocin titration at 2245 and amniotomy at 1915  She made slow, steady progress until reaching complete on 3/5/18 at 0500 and pushing at [facility]  She delivered a viable male  on 3/5/18 at 51 771 18 31  Weight 8lbs 10oz via normal spontaneous vaginal delivery  She sustained a second degree perineal laceration and bilateral vaginal lacerations during delivery which were adequately repaired  Apgars were 7 (1 min) and 8 (5 min)   was transferred to  nursery  Patient tolerated the procedure well  Her post-delivery course was uncomplicated  Her postpartum pain was well controlled with oral analgesics  On day of discharge, she was ambulating and able to reasonably perform all ADLs  She was voiding and had appropriate bowel function  Pain was well controlled  She was discharged home on postpartum day #1 without complications  Patient was instructed to follow up with her OB as an outpatient and was given appropriate warnings to call provider if she develops signs of infection or uncontrolled pain  Complications: None    Condition at discharge: good     Discharge Medications: For a complete list of the patient's medications, please refer to her medical reconcillation report  Discharge instructions/Information to patient and family:   See after visit summary for information provided to patient and family        Provisions for Follow-Up Care:  See after visit summary for information related to follow-up care and any pertinent home health orders  Disposition: See After Visit Summary for discharge disposition information      Planned Readmission: No    Gautam Terrell MD  OBGYN, PGY-1  3/12/2018 11:02 AM

## 2018-03-05 NOTE — PLAN OF CARE
BIRTH - VAGINAL/ SECTION     Fetal and maternal status remain reassuring during the birth process Completed     Emotionally satisfying birthing experience for mother/fetus Completed        Knowledge Deficit     Verbalizes understanding of labor plan Completed        Labor & Delivery     Manages discomfort Completed     Patient vital signs are stable Completed        Potential for Falls     Patient will remain free of falls Completed

## 2018-03-05 NOTE — LACTATION NOTE
This note was copied from a baby's chart  CONSULT - LACTATION  Baby Boy  Arias Raffi) David 0 days male MRN: [id number]    [facility] Room / Bed: (N)/(N) Encounter: [id number]    Maternal Information     MOTHER:  Tamera Hernandez  Maternal Age: 20 y o    OB History: #: 1, Date: 18, Sex: Male, Weight: 3920 g (8 lb 10 3 oz), GA: 41w1d, Delivery: Vaginal, Spontaneous Delivery, Apgar1: 7, Apgar5: 8, Living: Living, Birth Comments: None   Previouse breast reduction surgery? No    Lactation history:   Has patient previously breast fed: No   How long had patient previously breast fed:     Previous breast feeding complications:     History reviewed  No pertinent surgical history  Birth information:  YOB: 2019   Time of birth: 5:5 AM   Sex: male   Delivery type: Vaginal, Spontaneous Delivery   Birth Weight: 3920 g (8 lb 10 3 oz)   Percent of Weight Change: 0%     Gestational Age: 40w1d   [unfilled]    Assessment     Breast and nipple assessment: not assessed at this time    [place] Assessment: not assessed at this time    Feeding assessment: not assessed at this time    Feeding recommendations:  breast feed on demand     Discussed 2nd night syndrome and ways to calm infant  Hand out given  Information on hand expression given  Discussed benefits of knowing how to manually express breast including stimulating milk supply, softening nipple for latch and evacuating breast in the event of engorgement  Met with mother  Provided mother with Ready, Set, Baby booklet  Discussed Skin to Skin contact an benefits to mom and baby  Talked about the delay of the first bath until baby has adjusted  Spoke about the benefits of rooming in  Feeding on cue and what that means for recognizing infant's hunger  Avoidance of pacifiers for the first month discussed  Talked about exclusive breastfeeding for the first 6 months      Positioning and latch reviewed as well as showing images of other feeding positions  Discussed the properties of a good latch in any position  Reviewed hand/manual expression  Discussed s/s that baby is getting enough milk and some s/s that breastfeeding dyad may need further help  Gave information on common concerns, what to expect the first few weeks after delivery, preparing for other caregivers, and how partners can help  Resources for support also provided  [employer] MOB and FOB to call for assistance, questions and concerns  Extension number for inpatient lactation support provided      Ashley Sims RN 3/5/2018 11:25 AM

## 2018-03-05 NOTE — PLAN OF CARE
DISCHARGE PLANNING     Discharge to home or other facility with appropriate resources Progressing        DISCHARGE PLANNING - CARE MANAGEMENT     Discharge to post-acute care or home with appropriate resources Progressing        INFECTION - ADULT     Absence or prevention of progression during hospitalization Progressing     Absence of fever/infection during neutropenic period Progressing        Knowledge Deficit     Patient/family/caregiver demonstrates understanding of disease process, treatment plan, medications, and discharge instructions Progressing        PAIN - ADULT     Verbalizes/displays adequate comfort level or baseline comfort level Progressing        POSTPARTUM     Experiences normal postpartum course Progressing     Appropriate maternal -  bonding Progressing     Establishment of infant feeding pattern Progressing     Incision(s), wounds(s) or drain site(s) healing without S/S of infection Progressing        SAFETY ADULT     Patient will remain free of falls Progressing     Maintain or return to baseline ADL function Progressing     Maintain or return mobility status to optimal level Progressing

## 2018-03-05 NOTE — OB LABOR/OXYTOCIN SAFETY PROGRESS
Oxytocin Safety Progress Check Note - Km Cortez 20 y o  female MRN: [id number]    Unit/Bed#: -01 Encounter: [id number]    Obstetric History       T0      L0     SAB0   TAB0   Ectopic0   Multiple0   Live Births0      Gestational Age: 41w1d  Dose (ector-units/min) Oxytocin: 6 ector-units/min  Contraction Frequency (minutes): 2-3  Contraction Quality: Moderate, Strong  Tachysystole: No   Dilation: 8        Effacement (%): 100  Station: 0  Baseline Rate: 140 bpm  Fetal Heart Rate: 140 BPM  FHR Category: Category I          Notes/comments:   Pt comfortable, making excellent cervical change  Cont pitocin  FHT Cat I, cont to monitor  Recheck in 1-2hr, sooner if uncomfortable    Discussed with Dr Basilio Pérez DO 3/5/2018 2:59 AM

## 2018-03-05 NOTE — OB LABOR/OXYTOCIN SAFETY PROGRESS
Oxytocin Safety Progress Check Note - Ny Man 20 y o  female MRN: [id number]    Unit/Bed#: -01 Encounter: [id number]    Obstetric History       T0      L0     SAB0   TAB0   Ectopic0   Multiple0   Live Births0      Gestational Age: 41w1d  Dose (ector-units/min) Oxytocin: 4 ector-units/min  Contraction Frequency (minutes): 2-3 5  Contraction Quality: Moderate, Strong  Tachysystole: No   Dilation: 5        Effacement (%): 90  Station: -1  Baseline Rate: 140 bpm  Fetal Heart Rate: 140 BPM  FHR Category: Category I          Notes/comments:   Pt comfortable with epidural, sleeping upon arrival  Making good cervical change  FHT Cat I, cont to monitor  Recheck in 1-2hr, sooner if uncomfortable          Ember Jaquez DO 3/5/2018 12:51 AM

## 2018-03-05 NOTE — ANESTHESIA PROCEDURE NOTES
Epidural Block    Patient location during procedure: OB  Start time: 3/4/2018 9:30 PM  Reason for block: at surgeon's request and primary anesthetic  Staffing  Anesthesiologist: Rita Vyas  Performed: anesthesiologist   Preanesthetic Checklist  Completed: patient identified, surgical consent, pre-op evaluation, timeout performed, IV checked, risks and benefits discussed and monitors and equipment checked  Epidural  Patient position: sitting  Prep: Betadine, site prepped and draped and swab x3  Patient monitoring: heart rate, continuous pulse ox and frequent blood pressure checks  Approach: midline  Location: lumbar (1-5)  Injection technique: SALOME saline  Needle  Needle type: Tuohy   Needle gauge: 18 G  Catheter type: side hole  Catheter size: 20 G  Catheter at skin depth: 10 cm  Test dose: negative and lidocaine 1 5% with epinephrine 1-to-200,000 (5 cc)negative aspiration for CSF, negative aspiration for heme and no paresthesia on injection  patient tolerated the procedure well with no immediate complications

## 2018-03-06 VITALS
TEMPERATURE: 97.9 F | DIASTOLIC BLOOD PRESSURE: 64 MMHG | OXYGEN SATURATION: 98 % | RESPIRATION RATE: 20 BRPM | HEIGHT: 67 IN | SYSTOLIC BLOOD PRESSURE: 118 MMHG | HEART RATE: 72 BPM | WEIGHT: 197 LBS | BODY MASS INDEX: 30.92 KG/M2

## 2018-03-06 RX ADMIN — DOCUSATE SODIUM 100 MG: 100 CAPSULE, LIQUID FILLED ORAL at 08:17

## 2018-03-06 RX ADMIN — Medication 1 TABLET: at 08:17

## 2018-03-06 NOTE — PROGRESS NOTES
Progress Note - OB/GYN   Dewain  20 y o  female MRN: [id number]  Unit/Bed#: -01 Encounter: [id number]    Assessment:  Post partum Day #1 s/p , stable    Plan:  Continue routine post partum care  Encourage ambulation  Encourage breastfeeding  Pain control PRN    Subjective/Objective   Chief Complaint:     Post delivery    Subjective:   No complaints this morning  Pain: no  Tolerating PO: yes  Voiding: yes  Flatus: yes  BM: no  Ambulating: yes  Breastfeeding:  yes  Chest pain: no  Shortness of breath: no  Leg pain: no  Lochia: minimal    Objective:     Vitals: /62 (BP Location: Right arm)   Pulse 73   Temp 98 2 °F (36 8 °C) (Oral)   Resp 18   Ht 5' 7" (1 702 m)   Wt 89 4 kg (197 lb)   LMP 2017   SpO2 98%   Breastfeeding?  Yes   BMI 30 85 kg/m²       Intake/Output Summary (Last 24 hours) at 18 0630  Last data filed at 18 195   Gross per 24 hour   Intake              500 ml   Output             1100 ml   Net             -600 ml       Lab Results   Component Value Date    WBC 9 87 2018    HGB 11 2 (L) 2018    HCT 32 3 (L) 2018    MCV 86 2018     (L) 2018       Meds/Allergies   Current Facility-Administered Medications   Medication Dose Route Frequency    acetaminophen (TYLENOL) tablet 650 mg  650 mg Oral Q4H PRN    benzocaine-menthol-lanolin-aloe (DERMOPLAST) 20-0 5 % topical spray   Topical 4x Daily PRN    docusate sodium (COLACE) capsule 100 mg  100 mg Oral BID    hydrocortisone 1 % cream 1 application  1 application Topical PRN    ibuprofen (MOTRIN) tablet 600 mg  600 mg Oral Q6H PRN    magnesium hydroxide (MILK OF MAGNESIA) 400 mg/5 mL oral suspension 30 mL  30 mL Oral Daily PRN    measles-mumps-rubella (M-M-R II) subcutaneous injection 0 5 mL  0 5 mL Subcutaneous PRN    oxyCODONE-acetaminophen (PERCOCET) 5-325 mg per tablet 1 tablet  1 tablet Oral Q4H PRN    oxyCODONE-acetaminophen (PERCOCET) 5-325 mg per tablet 2 tablet  2 tablet Oral Q4H PRN    prenatal multivitamin tablet 1 tablet  1 tablet Oral Daily    senna (SENOKOT) tablet 8 6 mg  1 tablet Oral HS PRN    simethicone (MYLICON) chewable tablet 80 mg  80 mg Oral 4x Daily PRN    tetanus-diphtheria-acellular pertussis (BOOSTRIX) IM injection 0 5 mL  0 5 mL Intramuscular PRN    witch hazel-glycerin (TUCKS) topical pad 1 pad  1 pad Topical PRN       Physical Exam:     Gen: AAOx3, NAD  CV: RRR  Lungs: CTA b/l  Abd: Soft, non-tender, non-distended, no rebound or guarding  Uterine fundus firm and non-tender,  at the umbilicus   Ext: Non tender                 Angélica Vicente MD  OBGYN, PGY-1  3/6/2018 6:31 AM

## 2018-03-06 NOTE — LACTATION NOTE
This note was copied from a baby's chart  Met with mother to go over feeding log since birth for the first week  Emphasized 8 or more (12) feedings in a 24 hour period, what to expect for the number of diapers per day of life and the progression of properties of the  stooling pattern  Discussed s/s that breastfeeding is going well after day 4 and when to get help from a pediatrician or lactation support person after day 4  Booklet included Breast Pumping Instructions, When You Go Back to Work or School, and Breastfeeding Resources for after discharge including access to the number for the [employer]  Powerpoints offered on mom/ care class and breastfeeding class  Patient declined  Discussed s/s engorgement and how to manage with medications and cool compresses as well as s/s mastitis and when to contact physician  C/O sore nipples  Information given about sore nipples and how to correct with positioning techniques  Discussed maneuvers to latch infant on properly to avoid nipple pain and promote healing  Discussed treatments that could be utilized to promote healing  Hydro gel dressings given with instruction and verbalization of understanding of cleaning and duration of use  Encouraged MOB to call for assistance, questions, and concerns about breastfeeding  Extension provided

## 2018-03-06 NOTE — CASE MANAGEMENT
Notification of Maternity Inpatient Admission/Maternity Inpatient Authorization Request  This is a Notification of Maternity Inpatient Admission/Maternity Inpatient Authorization Request to our facility [facility]  Please be advised that this patient is currently in our facility under Inpatient Status  Below you will find the Birth/ Summary, Attending Physician and Facilitys information including NPI# and contact for the Utilization  assigned to the [facility] where the patient is receiving services  Please feel free to contact the Utilization Review Department with any questions  Mothers Information:  Jacob Samuels  MRN: [id number]  YOB: 1995  Admission Date: 3/4/2018 11:18 AM  Discharge Date: 3/6/2018  4:15 PM  Disposition: Home/Self Care  Admitting Diagnosis:   O80 VAGINAL DELIVERY  [place] Information:  Estimated Date of Delivery: 18  Information for the patient's :  Chelo Jacques) [[id number]]      Delivery Information:  Sex: male  Delivered 3/5/2018 6:10 AM by Vaginal, Spontaneous Delivery; Gestational Age: 40w1d     Measurements:  Weight: 8 lb 10 3 oz (3920 g); Height: 20 5"    APGAR 1 minute 5 minutes 10 minutes   Totals: 7 8 9     OB History      Para Term  AB Living    1 1 1     1    SAB TAB Ectopic Multiple Live Births          0 1        Attending Physician:  TITI Nelson  Specialty- Obstetrics and Gynecology  [facility] ID- [id number]  [address], [address] [facility]  Phone 1: [phone]  Fax: [phone] [address]  [phone]  Tax ID: [id number]  NPI: [id number]    [facility] in the [facility] by Manolo Donaldson for 2017  Network Utilization Review Department  Phone: [phone];  Fax [phone]  ATTENTION: The Network Utilization Review Department is now centralized for our 7 Facilities  Make a note that we have a new phone and fax numbers for our Department  Please call with any questions or concerns to [phone] and carefully follow the prompts so that you are directed to the right person  All voicemails are confidential  Fax any determinations, approvals, denials, and requests for initial or continue stay review clinical to [phone]  Due to HIGH CALL volume, it would be easier if you could please send faxed requests to expedite your requests and in part, help us provide discharge notifications faster

## 2018-03-16 LAB — PLACENTA IN STORAGE: NORMAL

## 2019-09-04 ENCOUNTER — ANNUAL EXAM (OUTPATIENT)
Dept: OBGYN CLINIC | Facility: CLINIC | Age: 24
End: 2019-09-04

## 2019-09-04 VITALS
DIASTOLIC BLOOD PRESSURE: 64 MMHG | BODY MASS INDEX: 21.97 KG/M2 | SYSTOLIC BLOOD PRESSURE: 102 MMHG | HEIGHT: 67 IN | WEIGHT: 140 LBS

## 2019-09-04 DIAGNOSIS — Z01.419 ENCOUNTER FOR WELL WOMAN EXAM: Primary | ICD-10-CM

## 2019-09-04 PROCEDURE — 99395 PREV VISIT EST AGE 18-39: CPT | Performed by: PHYSICIAN ASSISTANT

## 2019-12-19 ENCOUNTER — TRANSCRIBE ORDERS (OUTPATIENT)
Dept: OBGYN CLINIC | Facility: CLINIC | Age: 24
End: 2019-12-19

## 2019-12-19 DIAGNOSIS — Z32.00 ENCOUNTER FOR PREGNANCY TEST, RESULT UNKNOWN: Primary | ICD-10-CM

## 2020-02-05 ENCOUNTER — INITIAL PRENATAL (OUTPATIENT)
Dept: OBGYN CLINIC | Facility: CLINIC | Age: 25
End: 2020-02-05
Payer: COMMERCIAL

## 2020-02-05 VITALS — WEIGHT: 158 LBS | SYSTOLIC BLOOD PRESSURE: 120 MMHG | BODY MASS INDEX: 24.75 KG/M2 | DIASTOLIC BLOOD PRESSURE: 80 MMHG

## 2020-02-05 DIAGNOSIS — Z11.3 SCREENING FOR STDS (SEXUALLY TRANSMITTED DISEASES): Primary | ICD-10-CM

## 2020-02-05 DIAGNOSIS — Z36.89 ENCOUNTER FOR OTHER SPECIFIED ANTENATAL SCREENING: ICD-10-CM

## 2020-02-05 DIAGNOSIS — Z11.8 SCREENING FOR CHLAMYDIAL DISEASE: ICD-10-CM

## 2020-02-05 DIAGNOSIS — Z3A.11 11 WEEKS GESTATION OF PREGNANCY: ICD-10-CM

## 2020-02-05 PROCEDURE — PNV: Performed by: OBSTETRICS & GYNECOLOGY

## 2020-02-05 PROCEDURE — 76815 OB US LIMITED FETUS(S): CPT | Performed by: OBSTETRICS & GYNECOLOGY

## 2020-02-05 NOTE — PROGRESS NOTES
Patient is a  S/P spontaneous vaginal delivery on 3/5/18 at 36 weeks presenting today for prenatal visit with estimated gestational age of 5W2  No bleeding, spotting or leaking  Patient states that she has mild nausea, but is tolerating well  No other complaints  Not interested in early testing  Abdominal US  CRL: 37H9  + fetal heart rate and fetal movement  Impression: Normal 11 week prenatal visit  Plan:  Encouraged healthy diet and exercise  Patient plans on breast feeding   Check prenatal labs and culture results

## 2020-02-05 NOTE — PROGRESS NOTES
WILIAM: 8/25/2020  1st OB- Gc/chlamydia  No bleeding or cramping  No vomiting, headache or dizziness  The patient has nausea occasionally in the morning

## 2020-02-06 LAB
DEPRECATED C TRACH RRNA XXX QL PRB: NOT DETECTED
N GONORRHOEA DNA UR QL NAA+PROBE: NOT DETECTED

## 2020-02-07 LAB
EXTERNAL ABO GROUPING: NORMAL
EXTERNAL ANTIBODY SCREEN: NORMAL
EXTERNAL HEMATOCRIT: 33.3 %
EXTERNAL HEMOGLOBIN: 11.8 G/DL
EXTERNAL HEPATITIS B SURFACE ANTIGEN: NORMAL
EXTERNAL PLATELET COUNT: 235 K/ΜL
EXTERNAL RH FACTOR: POSITIVE
EXTERNAL RUBELLA IGG QUANTITATION: NORMAL
EXTERNAL SYPHILIS RPR SCREEN: NORMAL

## 2020-03-05 ENCOUNTER — ROUTINE PRENATAL (OUTPATIENT)
Dept: OBGYN CLINIC | Facility: CLINIC | Age: 25
End: 2020-03-05

## 2020-03-05 VITALS — BODY MASS INDEX: 24.59 KG/M2 | SYSTOLIC BLOOD PRESSURE: 118 MMHG | WEIGHT: 157 LBS | DIASTOLIC BLOOD PRESSURE: 80 MMHG

## 2020-03-05 DIAGNOSIS — Z34.82 PRENATAL CARE, SUBSEQUENT PREGNANCY IN SECOND TRIMESTER: Primary | ICD-10-CM

## 2020-03-05 DIAGNOSIS — Z36.9 ANTENATAL SCREENING ENCOUNTER: ICD-10-CM

## 2020-03-05 PROCEDURE — PNV: Performed by: PHYSICIAN ASSISTANT

## 2020-04-02 ENCOUNTER — TELEMEDICINE (OUTPATIENT)
Dept: OBGYN CLINIC | Facility: CLINIC | Age: 25
End: 2020-04-02

## 2020-04-02 DIAGNOSIS — Z3A.19 19 WEEKS GESTATION OF PREGNANCY: Primary | ICD-10-CM

## 2020-04-02 PROCEDURE — PNV: Performed by: OBSTETRICS & GYNECOLOGY

## 2020-05-11 ENCOUNTER — TELEMEDICINE (OUTPATIENT)
Dept: OBGYN CLINIC | Facility: CLINIC | Age: 25
End: 2020-05-11

## 2020-05-11 DIAGNOSIS — Z34.82 PRENATAL CARE, SUBSEQUENT PREGNANCY IN SECOND TRIMESTER: Primary | ICD-10-CM

## 2020-05-11 DIAGNOSIS — Z36.9 ANTENATAL SCREENING ENCOUNTER: ICD-10-CM

## 2020-05-11 PROCEDURE — PNV: Performed by: PHYSICIAN ASSISTANT

## 2020-05-21 ENCOUNTER — TELEPHONE (OUTPATIENT)
Dept: OBGYN CLINIC | Facility: CLINIC | Age: 25
End: 2020-05-21

## 2020-06-01 ENCOUNTER — APPOINTMENT (OUTPATIENT)
Dept: LAB | Facility: CLINIC | Age: 25
End: 2020-06-01
Payer: COMMERCIAL

## 2020-06-01 DIAGNOSIS — Z36.9 ANTENATAL SCREENING ENCOUNTER: ICD-10-CM

## 2020-06-01 LAB
BASOPHILS # BLD AUTO: 0.05 THOUSANDS/ΜL (ref 0–0.1)
BASOPHILS NFR BLD AUTO: 1 % (ref 0–1)
EOSINOPHIL # BLD AUTO: 0.1 THOUSAND/ΜL (ref 0–0.61)
EOSINOPHIL NFR BLD AUTO: 1 % (ref 0–6)
ERYTHROCYTE [DISTWIDTH] IN BLOOD BY AUTOMATED COUNT: 13.1 % (ref 11.6–15.1)
GLUCOSE 1H P 50 G GLC PO SERPL-MCNC: 97 MG/DL
HCT VFR BLD AUTO: 35.6 % (ref 34.8–46.1)
HGB BLD-MCNC: 11.8 G/DL (ref 11.5–15.4)
IMM GRANULOCYTES # BLD AUTO: 0.05 THOUSAND/UL (ref 0–0.2)
IMM GRANULOCYTES NFR BLD AUTO: 1 % (ref 0–2)
LYMPHOCYTES # BLD AUTO: 1.63 THOUSANDS/ΜL (ref 0.6–4.47)
LYMPHOCYTES NFR BLD AUTO: 20 % (ref 14–44)
MCH RBC QN AUTO: 31.5 PG (ref 26.8–34.3)
MCHC RBC AUTO-ENTMCNC: 33.1 G/DL (ref 31.4–37.4)
MCV RBC AUTO: 95 FL (ref 82–98)
MONOCYTES # BLD AUTO: 0.33 THOUSAND/ΜL (ref 0.17–1.22)
MONOCYTES NFR BLD AUTO: 4 % (ref 4–12)
NEUTROPHILS # BLD AUTO: 5.93 THOUSANDS/ΜL (ref 1.85–7.62)
NEUTS SEG NFR BLD AUTO: 73 % (ref 43–75)
NRBC BLD AUTO-RTO: 0 /100 WBCS
PLATELET # BLD AUTO: 195 THOUSANDS/UL (ref 149–390)
PMV BLD AUTO: 9.8 FL (ref 8.9–12.7)
RBC # BLD AUTO: 3.75 MILLION/UL (ref 3.81–5.12)
RPR SER QL: NORMAL
WBC # BLD AUTO: 8.09 THOUSAND/UL (ref 4.31–10.16)

## 2020-06-01 PROCEDURE — 86592 SYPHILIS TEST NON-TREP QUAL: CPT

## 2020-06-01 PROCEDURE — 82950 GLUCOSE TEST: CPT

## 2020-06-01 PROCEDURE — 85025 COMPLETE CBC W/AUTO DIFF WBC: CPT

## 2020-06-01 PROCEDURE — 36415 COLL VENOUS BLD VENIPUNCTURE: CPT

## 2020-06-08 ENCOUNTER — ROUTINE PRENATAL (OUTPATIENT)
Dept: OBGYN CLINIC | Facility: CLINIC | Age: 25
End: 2020-06-08

## 2020-06-08 VITALS — WEIGHT: 187 LBS | SYSTOLIC BLOOD PRESSURE: 132 MMHG | BODY MASS INDEX: 29.29 KG/M2 | DIASTOLIC BLOOD PRESSURE: 70 MMHG

## 2020-06-08 DIAGNOSIS — Z3A.29 29 WEEKS GESTATION OF PREGNANCY: Primary | ICD-10-CM

## 2020-06-08 PROCEDURE — PNV: Performed by: OBSTETRICS & GYNECOLOGY

## 2020-06-23 ENCOUNTER — ROUTINE PRENATAL (OUTPATIENT)
Dept: OBGYN CLINIC | Facility: CLINIC | Age: 25
End: 2020-06-23

## 2020-06-23 VITALS — DIASTOLIC BLOOD PRESSURE: 78 MMHG | WEIGHT: 195.5 LBS | BODY MASS INDEX: 30.62 KG/M2 | SYSTOLIC BLOOD PRESSURE: 118 MMHG

## 2020-06-23 DIAGNOSIS — Z34.83 PRENATAL CARE, SUBSEQUENT PREGNANCY IN THIRD TRIMESTER: Primary | ICD-10-CM

## 2020-06-23 PROCEDURE — PNV: Performed by: PHYSICIAN ASSISTANT

## 2020-07-07 ENCOUNTER — ROUTINE PRENATAL (OUTPATIENT)
Dept: OBGYN CLINIC | Facility: CLINIC | Age: 25
End: 2020-07-07

## 2020-07-07 VITALS — DIASTOLIC BLOOD PRESSURE: 70 MMHG | BODY MASS INDEX: 29.76 KG/M2 | WEIGHT: 190 LBS | SYSTOLIC BLOOD PRESSURE: 110 MMHG

## 2020-07-07 DIAGNOSIS — Z3A.33 33 WEEKS GESTATION OF PREGNANCY: Primary | ICD-10-CM

## 2020-07-07 PROCEDURE — PNV: Performed by: OBSTETRICS & GYNECOLOGY

## 2020-07-07 NOTE — PROGRESS NOTES
doing well  No contractions no bleeding no leaking  Breast-feeding birth control discussed  Patient has a breast pump  Information on perineal massage given    Return to office 2 weeks for GBS culture

## 2020-07-21 ENCOUNTER — ROUTINE PRENATAL (OUTPATIENT)
Dept: OBGYN CLINIC | Facility: CLINIC | Age: 25
End: 2020-07-21

## 2020-07-21 VITALS — BODY MASS INDEX: 31.4 KG/M2 | DIASTOLIC BLOOD PRESSURE: 72 MMHG | WEIGHT: 200.5 LBS | SYSTOLIC BLOOD PRESSURE: 112 MMHG

## 2020-07-21 DIAGNOSIS — Z34.83 PRENATAL CARE, SUBSEQUENT PREGNANCY IN THIRD TRIMESTER: Primary | ICD-10-CM

## 2020-07-21 PROCEDURE — PNV: Performed by: PHYSICIAN ASSISTANT

## 2020-07-21 NOTE — PROGRESS NOTES
Patient is feeling well  No bleeding or cramping  No contractions, leaking or headaches  Patient does have some swelling

## 2020-07-23 NOTE — PROGRESS NOTES
No c/o  Feeling well  Some b/l le swelling at end of day  Hydrate-rest-elevate  GBS next visit  Has pump  Declines tdap

## 2020-07-28 ENCOUNTER — ROUTINE PRENATAL (OUTPATIENT)
Dept: OBGYN CLINIC | Facility: CLINIC | Age: 25
End: 2020-07-28

## 2020-07-28 VITALS — DIASTOLIC BLOOD PRESSURE: 82 MMHG | WEIGHT: 196 LBS | SYSTOLIC BLOOD PRESSURE: 120 MMHG | BODY MASS INDEX: 30.7 KG/M2

## 2020-07-28 DIAGNOSIS — Z3A.36 36 WEEKS GESTATION OF PREGNANCY: ICD-10-CM

## 2020-07-28 DIAGNOSIS — Z36.85 ANTENATAL SCREENING FOR STREPTOCOCCUS B: Primary | ICD-10-CM

## 2020-07-28 PROCEDURE — PNV: Performed by: OBSTETRICS & GYNECOLOGY

## 2020-07-28 NOTE — PROGRESS NOTES
doing well  No bleeding, no cramping or contractions  GBS culture performed at today's visit  Breastfeeding, birth control, and perineal massage discussed  EFW in 2 weeks

## 2020-07-31 LAB — GP B STREP GENITAL QL CULT: NEGATIVE

## 2020-08-04 ENCOUNTER — ROUTINE PRENATAL (OUTPATIENT)
Dept: OBGYN CLINIC | Facility: CLINIC | Age: 25
End: 2020-08-04

## 2020-08-04 VITALS — BODY MASS INDEX: 32.58 KG/M2 | WEIGHT: 208 LBS | SYSTOLIC BLOOD PRESSURE: 120 MMHG | DIASTOLIC BLOOD PRESSURE: 80 MMHG

## 2020-08-04 DIAGNOSIS — Z34.83 PRENATAL CARE, SUBSEQUENT PREGNANCY IN THIRD TRIMESTER: Primary | ICD-10-CM

## 2020-08-04 PROCEDURE — PNV: Performed by: PHYSICIAN ASSISTANT

## 2020-08-11 ENCOUNTER — ROUTINE PRENATAL (OUTPATIENT)
Dept: OBGYN CLINIC | Facility: CLINIC | Age: 25
End: 2020-08-11
Payer: COMMERCIAL

## 2020-08-11 VITALS — WEIGHT: 199 LBS | BODY MASS INDEX: 31.17 KG/M2 | SYSTOLIC BLOOD PRESSURE: 120 MMHG | DIASTOLIC BLOOD PRESSURE: 70 MMHG

## 2020-08-11 DIAGNOSIS — Z3A.38 38 WEEKS GESTATION OF PREGNANCY: Primary | ICD-10-CM

## 2020-08-11 PROCEDURE — PNV: Performed by: OBSTETRICS & GYNECOLOGY

## 2020-08-11 PROCEDURE — 76815 OB US LIMITED FETUS(S): CPT | Performed by: OBSTETRICS & GYNECOLOGY

## 2020-08-11 NOTE — PROGRESS NOTES
Patient doing well  No leaking, contractions or bleeding  Breastfeeding and birth control discussed    AC: 339  FL: 67  GRUPO: 27  EFW: 3030g, 6lbs 10oz

## 2020-08-18 ENCOUNTER — ROUTINE PRENATAL (OUTPATIENT)
Dept: OBGYN CLINIC | Facility: CLINIC | Age: 25
End: 2020-08-18

## 2020-08-18 VITALS — WEIGHT: 208 LBS | SYSTOLIC BLOOD PRESSURE: 120 MMHG | DIASTOLIC BLOOD PRESSURE: 72 MMHG | BODY MASS INDEX: 32.58 KG/M2

## 2020-08-18 DIAGNOSIS — Z34.83 PRENATAL CARE, SUBSEQUENT PREGNANCY IN THIRD TRIMESTER: Primary | ICD-10-CM

## 2020-08-18 PROCEDURE — PNV: Performed by: PHYSICIAN ASSISTANT

## 2020-08-18 NOTE — PROGRESS NOTES
Pt has occ ctx  No major pressure  Baby still "high"  GBS is neg  Cervix is still closed and thick  NST next week

## 2020-08-18 NOTE — PROGRESS NOTES
Patient is feeling well  No change in pressure or contractions  No bleeding, leaking or cramping  No headaches and some swelling with being on her feet

## 2020-08-24 ENCOUNTER — ROUTINE PRENATAL (OUTPATIENT)
Dept: OBGYN CLINIC | Facility: CLINIC | Age: 25
End: 2020-08-24
Payer: COMMERCIAL

## 2020-08-24 VITALS — SYSTOLIC BLOOD PRESSURE: 120 MMHG | BODY MASS INDEX: 32.11 KG/M2 | WEIGHT: 205 LBS | DIASTOLIC BLOOD PRESSURE: 72 MMHG

## 2020-08-24 DIAGNOSIS — Z34.83 PRENATAL CARE, SUBSEQUENT PREGNANCY IN THIRD TRIMESTER: Primary | ICD-10-CM

## 2020-08-24 PROCEDURE — PNV: Performed by: PHYSICIAN ASSISTANT

## 2020-08-24 PROCEDURE — 59025 FETAL NON-STRESS TEST: CPT | Performed by: PHYSICIAN ASSISTANT

## 2020-08-25 NOTE — PROGRESS NOTES
Pt presents without complaints today  She is 40 weeks tomorrow  She has no pressure  No ctx  NST is reactive today

## 2020-08-30 ENCOUNTER — NURSE TRIAGE (OUTPATIENT)
Dept: OTHER | Facility: OTHER | Age: 25
End: 2020-08-30

## 2020-08-30 ENCOUNTER — HOSPITAL ENCOUNTER (OUTPATIENT)
Facility: HOSPITAL | Age: 25
Discharge: HOME | End: 2020-08-30
Attending: OBSTETRICS & GYNECOLOGY | Admitting: OBSTETRICS & GYNECOLOGY
Payer: COMMERCIAL

## 2020-08-30 VITALS
SYSTOLIC BLOOD PRESSURE: 124 MMHG | OXYGEN SATURATION: 97 % | DIASTOLIC BLOOD PRESSURE: 76 MMHG | TEMPERATURE: 97.8 F | HEART RATE: 94 BPM | RESPIRATION RATE: 20 BRPM

## 2020-08-30 PROBLEM — Z3A.40 40 WEEKS GESTATION OF PREGNANCY: Status: ACTIVE | Noted: 2018-03-04

## 2020-08-30 PROCEDURE — 99214 OFFICE O/P EST MOD 30 MIN: CPT

## 2020-08-30 PROCEDURE — G0463 HOSPITAL OUTPT CLINIC VISIT: HCPCS

## 2020-08-30 PROCEDURE — NC001 PR NO CHARGE: Performed by: FAMILY MEDICINE

## 2020-08-30 NOTE — TELEPHONE ENCOUNTER
Regarding: Contractions - 40 wks 5 days   ----- Message from Liane Isaacs MA sent at 8/30/2020  3:27 PM EDT -----  "I am 40 wks and 5 days  I have been having contractions for last few hours  They have become regular the last hour   They are about 2 - 4 mins apart"

## 2020-08-30 NOTE — PROGRESS NOTES
Triage Note - OB  Susana Vaughn 31 y o  female MRN: [id number]  Unit/Bed#: LD TRIAGE 4 Encounter: [id number]    OB TRIAGE NOTE  Susana Vaughn  [id number]  2020  8:47 PM  LD TRIAGE 4/LD TRIAGE 4-*    ASSESS:  31 y o   40w5d with contractions increasing in intensity and getting closer together since 12:00 today  PLAN  1) Rule out labor  Cervical exam: FT/thick/high @ 2833  Recheck @  remained unchanged  Patient no longer reports contractions at recheck     3) Discharge instructions  Patient instructed to call if experiencing worsening contractions, vaginal bleeding, loss of fluid or decreased fetal movement  She will follow up with her OBGYN on 2020  D/w Dr Bridgett Sena  ______________    SUBJECTIVE    WILIAM: Estimated Date of Delivery: 20    HPI Chronology:  31 y o  Trell Felipe presents with complaint of contractions increasing in intensity and getting closer together since 12:00 today  She reports at around 12:00 she had what felt like one long contraction that lasted for almost two hours and since then the contractions have been occurring every 2-5 minutes  She feel 5/10 pain in her lower abdomen and reports vaginal pain and pressure  Contractions: yes  Leakage: no  Bleeding: no  Fetal Movement: yes  Pelvic pain: yes    Vitals:   /76 (BP Location: Right arm)   Pulse 95   Temp 97 8 °F (36 6 °C) (Oral)   Resp 20   LMP 2019   There is no height or weight on file to calculate BMI  Review of Systems   Constitutional: Negative for chills and fever  Eyes: Negative for visual disturbance  Respiratory: Negative for chest tightness and shortness of breath  Cardiovascular: Positive for leg swelling  Negative for chest pain and palpitations  Gastrointestinal: Negative for nausea and vomiting  Genitourinary: Positive for pelvic pain and vaginal pain  Negative for dysuria, flank pain, vaginal bleeding and vaginal discharge     Musculoskeletal: Negative for back pain  Neurological: Negative for headaches  Physical Exam  Constitutional:       General: She is not in acute distress  Appearance: Normal appearance  Comments: Resting comfortably in bed   Cardiovascular:      Rate and Rhythm: Normal rate and regular rhythm  Heart sounds: Normal heart sounds  No murmur  No gallop  Pulmonary:      Effort: Pulmonary effort is normal  No respiratory distress  Breath sounds: Normal breath sounds  No wheezing  Chest:      Chest wall: No tenderness  Abdominal:      Tenderness: There is no abdominal tenderness  Musculoskeletal:         General: Swelling (+1 bilateral LE up to mid lower leg) present  Neurological:      Mental Status: She is alert  Psychiatric:         Mood and Affect: Mood normal          Behavior: Behavior normal        Cervical Exam: FT/Thick/High, remained unchanged after two hours    FHT:  Baseline Rate: 120 bpm  Variability: Moderate 6-25 bpm(minimal at times)  Accelerations: 15 x 15 or greater, At variable times  Decelerations: None  TOCO:   Contraction Frequency (minutes): 5-6  Contraction Duration (seconds):   Contraction Quality: Moderate    Labs: No results found for this or any previous visit (from the past 24 hour(s))  Lab, Imaging and other studies: I have personally reviewed pertinent reports        Cam Grajeda DO  8/30/2020  8:47 PM

## 2020-08-30 NOTE — TELEPHONE ENCOUNTER
TT sent to Dr Branden Ware called back and stated that mom should leave now for hospital   Called mom and let her know  Parents need to drop off other child at sitter's house and will be on their way  Called L&D [address] [place] [address] and spoke to Elena to inform Charge Nurse that they were coming

## 2020-08-30 NOTE — TELEPHONE ENCOUNTER
Reason for Disposition   [1] History of prior delivery (multipara) AND [2] contractions < 10 minutes apart AND [3] present 1 hour    Answer Assessment - Initial Assessment Questions  1  ONSET: "When did the symptoms begin?"         Started 1200 became more regular at 1400  2  CONTRACTIONS: "Describe the contractions that you are having " (e g , duration, frequency, regularity, severity)      1 minute each, every 2 - 4 minutes, regular, mild to moderate now  3  WILIAM: "What date are you expecting to deliver?"      08/25/2020  4  PARITY: "Have you had a baby before?" If yes, "How long did the labor last?"      Yes, lasted 24 hours,  5  FETAL MOVEMENT: "Has the baby's movement decreased or changed significantly from normal?"      Baby moving more  6  OTHER SYMPTOMS: "Do you have any other symptoms?" (e g , leaking fluid from vagina, vaginal bleeding, fever, hand/facial swelling)      Denies  Last baby, they had to break her water      Protocols used: PREGNANCY - LABOR-ADULT-

## 2020-08-31 ENCOUNTER — ANESTHESIA EVENT (INPATIENT)
Dept: ANESTHESIOLOGY | Facility: HOSPITAL | Age: 25
End: 2020-08-31
Payer: COMMERCIAL

## 2020-08-31 ENCOUNTER — ANESTHESIA (INPATIENT)
Dept: ANESTHESIOLOGY | Facility: HOSPITAL | Age: 25
End: 2020-08-31
Payer: COMMERCIAL

## 2020-08-31 ENCOUNTER — ROUTINE PRENATAL (OUTPATIENT)
Dept: OBGYN CLINIC | Facility: CLINIC | Age: 25
End: 2020-08-31

## 2020-08-31 ENCOUNTER — HOSPITAL ENCOUNTER (INPATIENT)
Facility: HOSPITAL | Age: 25
LOS: 2 days | Discharge: HOME | End: 2020-09-02
Attending: OBSTETRICS & GYNECOLOGY | Admitting: OBSTETRICS & GYNECOLOGY
Payer: COMMERCIAL

## 2020-08-31 VITALS — SYSTOLIC BLOOD PRESSURE: 130 MMHG | BODY MASS INDEX: 33.36 KG/M2 | WEIGHT: 213 LBS | DIASTOLIC BLOOD PRESSURE: 80 MMHG

## 2020-08-31 DIAGNOSIS — Z3A.41 41 WEEKS GESTATION OF PREGNANCY: Primary | ICD-10-CM

## 2020-08-31 DIAGNOSIS — Z3A.40 40 WEEKS GESTATION OF PREGNANCY: Primary | ICD-10-CM

## 2020-08-31 DIAGNOSIS — Z3A.41 41 WEEKS GESTATION OF PREGNANCY: ICD-10-CM

## 2020-08-31 LAB
ABO GROUP BLD: NORMAL
BASOPHILS # BLD AUTO: 0.03 THOUSANDS/ΜL (ref 0–0.1)
BASOPHILS NFR BLD AUTO: 0 % (ref 0–1)
BLD GP AB SCN SERPL QL: NEGATIVE
EOSINOPHIL # BLD AUTO: 0.11 THOUSAND/ΜL (ref 0–0.61)
EOSINOPHIL NFR BLD AUTO: 1 % (ref 0–6)
ERYTHROCYTE [DISTWIDTH] IN BLOOD BY AUTOMATED COUNT: 13.9 % (ref 11.6–15.1)
EXTERNAL GROUP B STREP ANTIGEN: NEGATIVE
HCT VFR BLD AUTO: 36.5 % (ref 34.8–46.1)
HGB BLD-MCNC: 12.3 G/DL (ref 11.5–15.4)
IMM GRANULOCYTES # BLD AUTO: 0.04 THOUSAND/UL (ref 0–0.2)
IMM GRANULOCYTES NFR BLD AUTO: 1 % (ref 0–2)
LYMPHOCYTES # BLD AUTO: 1.97 THOUSANDS/ΜL (ref 0.6–4.47)
LYMPHOCYTES NFR BLD AUTO: 24 % (ref 14–44)
MCH RBC QN AUTO: 30.6 PG (ref 26.8–34.3)
MCHC RBC AUTO-ENTMCNC: 33.7 G/DL (ref 31.4–37.4)
MCV RBC AUTO: 91 FL (ref 82–98)
MONOCYTES # BLD AUTO: 0.54 THOUSAND/ΜL (ref 0.17–1.22)
MONOCYTES NFR BLD AUTO: 7 % (ref 4–12)
NEUTROPHILS # BLD AUTO: 5.64 THOUSANDS/ΜL (ref 1.85–7.62)
NEUTS SEG NFR BLD AUTO: 67 % (ref 43–75)
NRBC BLD AUTO-RTO: 0 /100 WBCS
PLATELET # BLD AUTO: 196 THOUSANDS/UL (ref 149–390)
PMV BLD AUTO: 9.5 FL (ref 8.9–12.7)
RBC # BLD AUTO: 4.02 MILLION/UL (ref 3.81–5.12)
RH BLD: POSITIVE
SPECIMEN EXPIRATION DATE: NORMAL
WBC # BLD AUTO: 8.33 THOUSAND/UL (ref 4.31–10.16)

## 2020-08-31 PROCEDURE — 86900 BLOOD TYPING SEROLOGIC ABO: CPT | Performed by: OBSTETRICS & GYNECOLOGY

## 2020-08-31 PROCEDURE — 86592 SYPHILIS TEST NON-TREP QUAL: CPT | Performed by: OBSTETRICS & GYNECOLOGY

## 2020-08-31 PROCEDURE — 86850 RBC ANTIBODY SCREEN: CPT | Performed by: OBSTETRICS & GYNECOLOGY

## 2020-08-31 PROCEDURE — 85025 COMPLETE CBC W/AUTO DIFF WBC: CPT | Performed by: OBSTETRICS & GYNECOLOGY

## 2020-08-31 PROCEDURE — 99024 POSTOP FOLLOW-UP VISIT: CPT | Performed by: OBSTETRICS & GYNECOLOGY

## 2020-08-31 PROCEDURE — PNV: Performed by: OBSTETRICS & GYNECOLOGY

## 2020-08-31 PROCEDURE — 86901 BLOOD TYPING SEROLOGIC RH(D): CPT | Performed by: OBSTETRICS & GYNECOLOGY

## 2020-08-31 RX ORDER — BUTORPHANOL TARTRATE 1 MG/ML
1 INJECTION, SOLUTION INTRAMUSCULAR; INTRAVENOUS ONCE
Status: COMPLETED | OUTPATIENT
Start: 2020-08-31 | End: 2020-08-31

## 2020-08-31 RX ORDER — LIDOCAINE HYDROCHLORIDE AND EPINEPHRINE 15; 5 MG/ML; UG/ML
INJECTION, SOLUTION EPIDURAL AS NEEDED
Status: DISCONTINUED | OUTPATIENT
Start: 2020-08-31 | End: 2020-09-01 | Stop reason: HOSPADM

## 2020-08-31 RX ORDER — OXYTOCIN/RINGER'S LACTATE 30/500 ML
1-30 PLASTIC BAG, INJECTION (ML) INTRAVENOUS
Status: DISCONTINUED | OUTPATIENT
Start: 2020-08-31 | End: 2020-09-01

## 2020-08-31 RX ORDER — OXYTOCIN/RINGER'S LACTATE 30/500 ML
PLASTIC BAG, INJECTION (ML) INTRAVENOUS
Status: COMPLETED
Start: 2020-08-31 | End: 2020-08-31

## 2020-08-31 RX ORDER — ONDANSETRON 2 MG/ML
4 INJECTION INTRAMUSCULAR; INTRAVENOUS EVERY 6 HOURS PRN
Status: DISCONTINUED | OUTPATIENT
Start: 2020-08-31 | End: 2020-09-01

## 2020-08-31 RX ORDER — SODIUM CHLORIDE, SODIUM LACTATE, POTASSIUM CHLORIDE, CALCIUM CHLORIDE 600; 310; 30; 20 MG/100ML; MG/100ML; MG/100ML; MG/100ML
125 INJECTION, SOLUTION INTRAVENOUS CONTINUOUS
Status: DISCONTINUED | OUTPATIENT
Start: 2020-08-31 | End: 2020-09-01

## 2020-08-31 RX ADMIN — Medication 2 MILLI-UNITS/MIN: at 23:09

## 2020-08-31 RX ADMIN — SODIUM CHLORIDE, SODIUM LACTATE, POTASSIUM CHLORIDE, AND CALCIUM CHLORIDE 125 ML/HR: .6; .31; .03; .02 INJECTION, SOLUTION INTRAVENOUS at 17:32

## 2020-08-31 RX ADMIN — LIDOCAINE HYDROCHLORIDE AND EPINEPHRINE 2 ML: 15; 5 INJECTION, SOLUTION EPIDURAL at 22:27

## 2020-08-31 RX ADMIN — BUTORPHANOL TARTRATE 1 MG: 1 INJECTION, SOLUTION INTRAMUSCULAR; INTRAVENOUS at 21:46

## 2020-08-31 RX ADMIN — LIDOCAINE HYDROCHLORIDE AND EPINEPHRINE 3 ML: 15; 5 INJECTION, SOLUTION EPIDURAL at 22:26

## 2020-08-31 RX ADMIN — ROPIVACAINE HYDROCHLORIDE: 2 INJECTION, SOLUTION EPIDURAL; INFILTRATION at 22:56

## 2020-08-31 RX ADMIN — SODIUM CHLORIDE, SODIUM LACTATE, POTASSIUM CHLORIDE, AND CALCIUM CHLORIDE 900 ML/HR: .6; .31; .03; .02 INJECTION, SOLUTION INTRAVENOUS at 22:29

## 2020-08-31 NOTE — OB LABOR/OXYTOCIN SAFETY PROGRESS
Labor Progress Note - Alexis Bonner 31 y o  female MRN: [id number]    Unit/Bed#: -01 Encounter: [id number]       Contraction Frequency (minutes): 4-5  Contraction Quality: Mild      Cervical Dilation: 4        Cervical Effacement: 60  Fetal Station: -2  Baseline Rate: 120 bpm  Fetal Heart Rate: 122 BPM  FHR Category: Category I               Vital Signs:   Vitals:    08/31/20 1659   BP:    Pulse: 96   Resp: 22   Temp: (!) 97 2 °F (36 2 °C)           Notes/comments:    Pt comfortable with contractions at this time  SVE noted above  AROM for meconium stained fluid at 1908  Discussed expectant management at this time now that ROM, however, reviewed indications for pitocin  Pt desires epidural prior to initiation of pitocin if needed  Dr Yanet Newton updated  FHT category I       Fly Dillon MD 8/31/2020 7:10 PM

## 2020-08-31 NOTE — H&P
H&P Exam - Obstetrics   Marie Luevano 31 y o  female MRN: [id number]  Unit/Bed#: -01 Encounter: [id number]      History of Present Illness     Chief Complaint: Active labor    HPI:  Marie Luevano is a 31 y o   female with an WILIAM of 2020, by Last Menstrual Period at 40w6d weeks gestation who is being admitted for labor  She reports contractions but is overall comfortable  She desires an epidural, prior to pitocin titration if possible  She feels generally well today and has no complaints  Contractions: yes  Loss of fluid: no  Vaginal bleeding: no  Fetal movement: yes    She is a patient of Dr Rudy Chavisms:   1) Echogenic intracardiac focus    OB History    Para Term  AB Living   2 1 1     1   SAB TAB Ectopic Multiple Live Births         0 1      # Outcome Date GA Lbr Chau/2nd Weight Sex Delivery Anes PTL Lv   2 Current            1 Term 18 41w1d / 01:10 3920 g (8 lb 10 3 oz) M Vag-Spont EPI N NITO       Baby complications/comments: echogenic intracardiac focus    Review of Systems   Constitutional: Negative for appetite change, diaphoresis and fever  HENT: Negative  Eyes: Negative  Respiratory: Negative for chest tightness, shortness of breath and wheezing  Cardiovascular: Negative for chest pain, palpitations and leg swelling  Gastrointestinal: Negative for constipation, diarrhea, nausea and vomiting  Endocrine: Negative  Genitourinary: Negative for hematuria, vaginal bleeding and vaginal discharge  Musculoskeletal: Negative  Skin: Negative  Allergic/Immunologic: Negative  Neurological: Negative for dizziness, weakness and headaches  Hematological: Negative  Psychiatric/Behavioral: Negative            Historical Information   Past Medical History:   Diagnosis Date    Papanicolaou smear 2018    NEG    Varicella     childhood     Past Surgical History:   Procedure Laterality Date    VAGINAL DELIVERY       Social History   Social History     Substance and Sexual Activity   Alcohol Use Not Currently    Comment: Social     Social History     Substance and Sexual Activity   Drug Use No     Social History     Tobacco Use   Smoking Status Never Smoker   Smokeless Tobacco Never Used     Family History: non-contributory    Meds/Allergies      Medications Prior to Admission   Medication    Ferrous Sulfate (IRON PO)    Prenatal w/o A Vit-Fe Fum-FA (PRENATA PO)        Allergies   Allergen Reactions    Poison Ivy Extract Anaphylaxis    Cashew Nut Oil Hives    Pistachio Nut (Diagnostic) Hives       OBJECTIVE:    Vitals: /74, HR 93 bpm    Physical Exam  Constitutional:       Appearance: She is well-developed  HENT:      Head: Normocephalic and atraumatic  Eyes:      Conjunctiva/sclera: Conjunctivae normal    Neck:      Musculoskeletal: Normal range of motion and neck supple  Cardiovascular:      Rate and Rhythm: Normal rate and regular rhythm  Heart sounds: Normal heart sounds  Pulmonary:      Effort: Pulmonary effort is normal  No respiratory distress  Breath sounds: Normal breath sounds  Abdominal:      Palpations: Abdomen is soft  Tenderness: There is no abdominal tenderness  There is no guarding or rebound  Genitourinary:     Vagina: No vaginal discharge  Musculoskeletal: Normal range of motion  General: No tenderness  Skin:     General: Skin is warm and dry  Neurological:      Mental Status: She is alert and oriented to person, place, and time  Psychiatric:         Behavior: Behavior normal          Thought Content:  Thought content normal            Cervix:   3/50/-3    Fetal heart rate:    Baseline 120, moderate variability, no decelerations    [place]:    Contractions every 3-4 minutes    EFW: 8 lbs    GBS: negative    Prenatal Labs:   Blood Type:   Lab Results   Component Value Date/Time    ABO Grouping A 03/04/2018 04:56 PM     , D (Rh type):   Lab Results   Component Value Date/Time    Rh Factor Positive 2018 04:56 PM    HCT/HGB:   Lab Results   Component Value Date/Time    Hematocrit 35 6 2020 09:44 AM    Hemoglobin 11 8 2020 09:44 AM      , MCV:   Lab Results   Component Value Date/Time    MCV 95 2020 09:44 AM      , Platelets:   Lab Results   Component Value Date/Time    Platelets 567  09:44 AM      , 1 hour Glucola:   Lab Results   Component Value Date/Time    Glucose 97 2020 09:44 AM   ,  VDRL/RPR:   Lab Results   Component Value Date/Time    RPR Non-Reactive 2020 09:44 AM      ,  Hep B:   Lab Results   Component Value Date/Time    Hepatitis B Surface Ag neg 2017     ,Group B Strep:    Lab Results   Component Value Date/Time    Strep Grp B PCR Negative for Beta Hemolytic Strep Grp B by PCR 2018          Invasive Devices     None                   Assessment/Plan     ASSESSMENT:  32 yo  at 40w6d weeks gestation who is being admitted for labor  PLAN:    - Admit  - CBC, RPR, Blood Type  - Start with expectant management  - GBS negative  - Analgesia and/or epidural at patient request  - Anticipate     Discussed with Dr Rhianna Coon      This patient will be an INPATIENT  and I certify the anticipated length of stay is >2 Midnights      Robby Lee MD  2020  4:42 PM

## 2020-08-31 NOTE — PROGRESS NOTES
No bleeding  The patient had some contractions yesterday  The patient feels a lot of pelvic pressure when standing  No nausea, vomiting, headache or dizziness

## 2020-08-31 NOTE — PROGRESS NOTES
Patient was seen in labor room last night for rule out labor  She was 1 cm and sent home  She has continued to contract through the morning  In the office today she is 3 cm 50% -3 station with regular contractions  Plan:  Sent to labor room    GBS negative

## 2020-09-01 ENCOUNTER — TRANSCRIBE ORDERS (OUTPATIENT)
Dept: OBGYN CLINIC | Facility: CLINIC | Age: 25
End: 2020-09-01

## 2020-09-01 DIAGNOSIS — Z3A.41 41 WEEKS GESTATION OF PREGNANCY: Primary | ICD-10-CM

## 2020-09-01 LAB
BASE EXCESS BLDCOA CALC-SCNC: -0.3 MMOL/L (ref 3–11)
BASE EXCESS BLDCOV CALC-SCNC: 0 MMOL/L (ref 1–9)
HCO3 BLDCOA-SCNC: 27.4 MMOL/L (ref 17.3–27.3)
HCO3 BLDCOV-SCNC: 24.4 MMOL/L (ref 12.2–28.6)
O2 CT VFR BLDCOA CALC: 13.1 ML/DL
OXYHGB MFR BLDCOA: 56.4 %
OXYHGB MFR BLDCOV: 79 %
PCO2 BLDCOA: 56.1 MM[HG] (ref 30–60)
PCO2 BLDCOV: 39.2 MM HG (ref 27–43)
PH BLDCOA: 7.31 [PH] (ref 7.23–7.43)
PH BLDCOV: 7.41 [PH] (ref 7.19–7.49)
PO2 BLDCOA: 25 MM HG (ref 5–25)
PO2 BLDCOV: 33.3 MM HG (ref 15–45)
RPR SER QL: NORMAL
SAO2 % BLDCOV: 17.2 ML/DL

## 2020-09-01 PROCEDURE — 82805 BLOOD GASES W/O2 SATURATION: CPT | Performed by: OBSTETRICS & GYNECOLOGY

## 2020-09-01 PROCEDURE — 99024 POSTOP FOLLOW-UP VISIT: CPT | Performed by: OBSTETRICS & GYNECOLOGY

## 2020-09-01 PROCEDURE — 59400 OBSTETRICAL CARE: CPT | Performed by: OBSTETRICS & GYNECOLOGY

## 2020-09-01 PROCEDURE — 87389 HIV-1 AG W/HIV-1&-2 AB AG IA: CPT

## 2020-09-01 PROCEDURE — 0HQ9XZZ REPAIR PERINEUM SKIN, EXTERNAL APPROACH: ICD-10-PCS | Performed by: OBSTETRICS & GYNECOLOGY

## 2020-09-01 RX ORDER — DOCUSATE SODIUM 100 MG/1
100 CAPSULE, LIQUID FILLED ORAL 2 TIMES DAILY
Status: DISCONTINUED | OUTPATIENT
Start: 2020-09-01 | End: 2020-09-02 | Stop reason: HOSPADM

## 2020-09-01 RX ORDER — ACETAMINOPHEN 325 MG/1
650 TABLET ORAL EVERY 6 HOURS PRN
Status: DISCONTINUED | OUTPATIENT
Start: 2020-09-01 | End: 2020-09-02 | Stop reason: HOSPADM

## 2020-09-01 RX ORDER — OXYTOCIN/RINGER'S LACTATE 30/500 ML
250 PLASTIC BAG, INJECTION (ML) INTRAVENOUS CONTINUOUS
Status: DISCONTINUED | OUTPATIENT
Start: 2020-09-01 | End: 2020-09-02 | Stop reason: HOSPADM

## 2020-09-01 RX ORDER — ONDANSETRON 2 MG/ML
4 INJECTION INTRAMUSCULAR; INTRAVENOUS EVERY 8 HOURS PRN
Status: DISCONTINUED | OUTPATIENT
Start: 2020-09-01 | End: 2020-09-02 | Stop reason: HOSPADM

## 2020-09-01 RX ORDER — DIPHENHYDRAMINE HCL 25 MG
25 TABLET ORAL EVERY 6 HOURS PRN
Status: DISCONTINUED | OUTPATIENT
Start: 2020-09-01 | End: 2020-09-02 | Stop reason: HOSPADM

## 2020-09-01 RX ORDER — DIAPER,BRIEF,INFANT-TODD,DISP
1 EACH MISCELLANEOUS 4 TIMES DAILY PRN
Status: DISCONTINUED | OUTPATIENT
Start: 2020-09-01 | End: 2020-09-02 | Stop reason: HOSPADM

## 2020-09-01 RX ORDER — IBUPROFEN 600 MG/1
600 TABLET ORAL EVERY 6 HOURS PRN
Status: DISCONTINUED | OUTPATIENT
Start: 2020-09-01 | End: 2020-09-02 | Stop reason: HOSPADM

## 2020-09-01 RX ORDER — SIMETHICONE 80 MG
80 TABLET,CHEWABLE ORAL 4 TIMES DAILY PRN
Status: DISCONTINUED | OUTPATIENT
Start: 2020-09-01 | End: 2020-09-02 | Stop reason: HOSPADM

## 2020-09-01 RX ORDER — CALCIUM CARBONATE 200(500)MG
1000 TABLET,CHEWABLE ORAL DAILY PRN
Status: DISCONTINUED | OUTPATIENT
Start: 2020-09-01 | End: 2020-09-02 | Stop reason: HOSPADM

## 2020-09-01 RX ADMIN — Medication 1 TABLET: at 09:27

## 2020-09-01 RX ADMIN — SODIUM CHLORIDE, SODIUM LACTATE, POTASSIUM CHLORIDE, AND CALCIUM CHLORIDE 125 ML/HR: .6; .31; .03; .02 INJECTION, SOLUTION INTRAVENOUS at 01:10

## 2020-09-01 RX ADMIN — DOCUSATE SODIUM 100 MG: 100 CAPSULE, LIQUID FILLED ORAL at 09:27

## 2020-09-01 NOTE — PLAN OF CARE
Problem: DISCHARGE PLANNING - CARE MANAGEMENT  Goal: Discharge to post-acute care or home with appropriate resources  Description: INTERVENTIONS:  - Conduct assessment to determine patient/family and health care team treatment goals, and need for post-acute services based on payer coverage, community resources, and patient preferences, and barriers to discharge  - Address psychosocial, clinical, and financial barriers to discharge as identified in assessment in conjunction with the patient/family and health care team  - Arrange appropriate level of post-acute services according to patient's   needs and preference and payer coverage in collaboration with the physician and health care team  - Communicate with and update the patient/family, physician, and health care team regarding progress on the discharge plan    2020 by Jere Medrano RN  Outcome: Progressing  2020 by Jere Medrano RN  Outcome: Progressing     Problem: POSTPARTUM  Goal: Experiences normal postpartum course  Description: INTERVENTIONS:  - Monitor maternal vital signs  - Assess uterine involution and lochia  Outcome: Progressing  Goal: Appropriate maternal -  bonding  Description: INTERVENTIONS:  - Identify family support  - Assess for appropriate maternal/infant bonding   -Encourage maternal/infant bonding opportunities  - Referral to  or  as needed  Outcome: Progressing  Goal: Establishment of infant feeding pattern  Description: INTERVENTIONS:  - Assess breast/bottle feeding  - Refer to lactation as needed  2020 by Jere Medrano RN  Outcome: Progressing  2020 by Jere Medrano RN  Outcome: Progressing  Goal: Incision(s), wounds(s) or drain site(s) healing without S/S of infection  Description: INTERVENTIONS  - Assess and document risk factors for skin impairment   - Assess and document dressing, incision, wound bed, drain sites and surrounding tissue  - Consider nutrition services referral as needed  - Oral mucous membranes remain intact  - Provide patient/ family education  9/1/2020 7685 by Rama Melendez RN  Outcome: Progressing  9/1/2020 0228 by Rama Melendez RN  Outcome: Progressing

## 2020-09-01 NOTE — OB LABOR/OXYTOCIN SAFETY PROGRESS
Oxytocin Safety Progress Check Note - Alexis Bonner 31 y o  female MRN: [id number]    Unit/Bed#: -01 Encounter: [id number]    Dose (ector-units/min) Oxytocin: 4 ector-units/min  Contraction Frequency (minutes): 2-5  Contraction Quality: Moderate  Tachysystole: No   Cervical Dilation: 6        Cervical Effacement: 90  Fetal Station: -1  Baseline Rate: 115 bpm  Fetal Heart Rate: 119 BPM  FHR Category: Category I               Vital Signs:   Vitals:    09/01/20 0020   BP: 104/60   Pulse: 77   Resp:    Temp:    SpO2:            Notes/comments:    Pt comfortable with epidural  SVE noted above  FHT with early decelerations more recently  Making great cervical change  Will continue pitocin titration per protocol  Dr Yanet Newton notified       Fly Dillon MD 9/1/2020 12:27 AM

## 2020-09-01 NOTE — ANESTHESIA PREPROCEDURE EVALUATION
Procedure:  LABOR ANALGESIA    Relevant Problems   GYN   (+) 40 weeks gestation of pregnancy      Plts 196  Physical Exam    Airway    Mallampati score: III  TM Distance: >3 FB  Neck ROM: full     Dental   No notable dental hx     Cardiovascular  Rhythm: regular, Rate: normal, Cardiovascular exam normal    Pulmonary  Pulmonary exam normal     Other Findings        Anesthesia Plan  ASA Score- 2     Anesthesia Type- epidural with ASA Monitors  Additional Monitors:   Airway Plan:           Plan Factors-Exercise tolerance (METS): >4 METS  Chart reviewed  Existing labs reviewed  Patient summary reviewed  Patient is not a current smoker  Patient did not smoke on day of surgery  Induction-     Postoperative Plan-     Informed Consent- Anesthetic plan and risks discussed with patient  I personally reviewed this patient with the CRNA  Discussed and agreed on the Anesthesia Plan with the CRNA  Ann-Marie Izaguirre

## 2020-09-01 NOTE — L&D DELIVERY NOTE
Vaginal Delivery Summary - OB/GYN   Philly Olivas 31 y o  female MRN: [id number]  Unit/Bed#: -01 Encounter: [id number]          Predelivery Diagnosis:  1  Pregnancy at 41w+0d     Postdelivery Diagnosis:  1  Same as above  2  Delivery of term     Procedure: Spontaneous Vaginal Delivery, repair of 1st degree laceration    Attending: Dr Mark Cabrera    Assistant: Dr Percy Mcgill    Anesthesia: Epidural    QBL: 419cc  Admission H 3  Admission platelets: 347    Complications: none apparent    Specimens: cord blood, arterial and venous cord blood gasses, placenta to storage    Findings:   1  Viable male at 0120, with APGARS of 9 and 9 at 1 and 5 minutes respectively,  2  Spontaneous delivery of intact placenta at 0125  3  1st degree laceration repaired with 3-0 Vicryl rapide  4  Blood gases:    Umbilical Cord Venous Blood Gas:  Results from last 7 days   Lab Units 20  0123   PH COV  7 412   PCO2 COV mm HG 39 2   HCO3 COV mmol/L 24 4   BASE EXC COV mmol/L 0 0*   O2 CT CD VB mL/dL 17 2   O2 HGB, VENOUS CORD % 54 5     Umbilical Cord Arterial Blood Gas:  Results from last 7 days   Lab Units 20  0123   PH COA  7 306   PCO2 COA  56 1   PO2 COA mm HG 25 0   HCO3 COA mmol/L 27 4*   BASE EXC COA mmol/L -0 3*   O2 CONTENT CORD ART ml/dl 13 1   O2 HGB, ARTERIAL CORD % 56 4       Disposition:  Patient tolerated the procedure well and was recovering in labor and delivery room     Brief history and labor course:  Ms Philly Olivas is a 31 y o  Euna Ducking at 41wk0d  She presented to labor and was admitted for labor; her cervix was 3/50/-3 on admission  Her labor was augmented with AROM and pitocin titration  Her amniotic fluid was noted to be meconium stained  She progressed to complete cervical dilation and began pushing  Description of procedure    After pushing for 0 hrs and 5 mins, the patient delivered a viable male  at [address], wt pending, apgars of 9 (1 min) and 9 (5 min)   The fetal vertex delivered spontaneously  Baby was checked for nuchal and there was noted to be none  The anterior shoulder delivered atraumatically with maternal expulsive forces and the assistance of gentle downward traction  The posterior shoulder delivered with maternal expulsive forces and the assistance of gentle upward traction  The remainder of the fetus delivered spontaneously  Upon delivery, the infant was placed on the mothers abdomen and the cord was clamped and cut  Delayed cord clamping was performed  The infant was noted to cry spontaneously and was moving all extremities appropriately  There was no evidence for injury  Awaiting nurse resuscitators evaluated the   Arterial and venous cord blood gases and cord blood was collected for analysis  These were promptly sent to the lab  In the immediate post-partum, IV pitocin was administered, and the uterus was noted to contract down well with massage and pitocin  The placenta delivered spontaneously at 0125 and was noted to be intact and had a centrally inserted 3 vessel cord  The placenta was sent to storage  The vagina, cervix, perineum, and rectum were inspected and there was noted to be a 1st degree lacertion  Repair was completed with 3-0 Vicryl rapide  At the conclusion of the procedure, all needle, sponge, and instrument counts were noted to be correct  Patient tolerated the procedure well and was allowed to recover in labor and delivery room with family and  before being transferred to the post-partum floor  Dr Arnoldo Flaherty was present and participated in all key portions of the case      Erick Haider MD  2020  1:49 AM

## 2020-09-01 NOTE — ANESTHESIA POSTPROCEDURE EVALUATION
Post-Op Assessment Note    CV Status:  Stable    Pain management: satisfactory to patient     Mental Status:  Alert and awake   Hydration Status:  Euvolemic   PONV Controlled:  Controlled   Airway Patency:  Patent      Post Op Vitals Reviewed: Yes      Staff: CRNA     Post-op block assessment: no complications and catheter intact      No complications documented      BP   121/60   Temp      Pulse     Resp      SpO2

## 2020-09-01 NOTE — OB LABOR/OXYTOCIN SAFETY PROGRESS
Labor Progress Note - Jacob Samuels 31 y o  female MRN: [id number]    Unit/Bed#: -01 Encounter: [id number]       Contraction Frequency (minutes): 1 5-2 5  Contraction Quality: Moderate  Tachysystole: No   Cervical Dilation: 4        Cervical Effacement: 70  Fetal Station: -2  Baseline Rate: 120 bpm  Fetal Heart Rate: 122 BPM  FHR Category: Category I               Vital Signs: reviewed  Vitals:    08/31/20 2204   BP:    Pulse:    Resp:    Temp: (!) 96 6 °F (35 9 °C)           Notes/comments: Minimal cervical change noted  Patient desires an epidural before we start Pitocin  Pit bundle and augmentation consent signed  Dr Na Pineda aware          Yvonne Beckham MD 8/31/2020 10:06 PM

## 2020-09-01 NOTE — DISCHARGE SUMMARY
Discharge Summary - Rebecca Verde 31 y o  female MRN: [id number]    Unit/Bed#: -01 Encounter: [id number]    Admission Date: 2020     Discharge Date: 2020    Admitting Diagnosis:   1  Pregnancy at 41w0d  2  Labor  3  Echogenic intracardiac focus    Discharge Diagnosis: same, delivered    Procedures: spontaneous vaginal delivery    Admitting and Delivering Attending: Castillo Vo MD    Discharging Attending: Dr Analisa Alston Course:     Rebecca Verde is a 31 y o  Eleanora More at 41w0d wks who was initially admitted for early labor  Amniotomy was performed at 1908 for meconium stained fluid  She received an epidural for anesthesia  She was augmented with pitocin at 2309  She progressed to complete dilation at 0114  She delivered a viable male  on 20 at 0120  Weight 9lbs 1 9oz via spontaneous vaginal delivery  Apgars were 9 (1 min) and 9 (5 min)   was transferred to  nursery  Patient tolerated the procedure well and was transferred to recovery in stable condition  Her postpartum course was uncomplicated by  Her postpartum pain was well controlled with oral analgesics  On day of discharge, she was ambulating and able to reasonably perform all ADLs  She was voiding and had appropriate bowel function  Pain was well controlled  She was discharged home on postpartum day #1 without complications  Patient was instructed to follow up with her OB as an outpatient and was given appropriate warnings to call provider if she develops signs of infection or uncontrolled pain  Complications: none apparent    Condition at discharge: good      Discharge Medications:   Prenatal vitamin daily for 6 months or the duration of nursing whichever is longer    Motrin 600 mg orally every 6 hours as needed for pain  Tylenol (over the counter) per bottle directions as needed for pain  Hydrocortisone cream 1% (over the counter) applied 1-2x daily to hemorrhoids as needed  Witch hazel pads for hemorrhoidal discomfort as needed    Discharge instructions: See after visit summary for complete information  Do not place anything (no partner, tampons or douche) in your vagina for 6 weeks  You may walk for exercise for the first 6 weeks then gradually return to your usual activities     Please do not drive for 1 week if you have no stitches and for 2 weeks if you have stitches or underwent a  delivery     You may take baths or shower per your preference     Please look at your bust (breasts) in the mirror daily and call for redness or tenderness or increased warmth     If you have had a  please look at your incision daily as well and call us for increasing redness or steady drainage from the incision     Please call us for temperature > 100 4*F or 38* C, worsening pain or a foul discharge      Disposition: Home    Planned Readmission: No    Alessandro Burgos MD  PGY-1, OB/GYN  2020   5:54 AM

## 2020-09-01 NOTE — PLAN OF CARE
Problem: DISCHARGE PLANNING - CARE MANAGEMENT  Goal: Discharge to post-acute care or home with appropriate resources  Description: INTERVENTIONS:  - Conduct assessment to determine patient/family and health care team treatment goals, and need for post-acute services based on payer coverage, community resources, and patient preferences, and barriers to discharge  - Address psychosocial, clinical, and financial barriers to discharge as identified in assessment in conjunction with the patient/family and health care team  - Arrange appropriate level of post-acute services according to patient's   needs and preference and payer coverage in collaboration with the physician and health care team  - Communicate with and update the patient/family, physician, and health care team regarding progress on the discharge plan    2020 by Asher Maxwell RN  Outcome: Progressing  2020 by Asher Maxwell RN  Outcome: Progressing     Problem: POSTPARTUM  Goal: Experiences normal postpartum course  Description: INTERVENTIONS:  - Monitor maternal vital signs  - Assess uterine involution and lochia  2020 by Asher Maxwlel RN  Outcome: Progressing  2020 by Asher Maxwell RN  Outcome: Progressing  Goal: Appropriate maternal -  bonding  Description: INTERVENTIONS:  - Identify family support  - Assess for appropriate maternal/infant bonding   -Encourage maternal/infant bonding opportunities  - Referral to  or  as needed  2020 by Asher Maxwell RN  Outcome: Progressing  2020 by Asher Maxwell RN  Outcome: Progressing  Goal: Establishment of infant feeding pattern  Description: INTERVENTIONS:  - Assess breast/bottle feeding  - Refer to lactation as needed  2020 by Asher Maxwell RN  Outcome: Progressing  2020 by Asher Maxwell RN  Outcome: Progressing  Goal: Incision(s), wounds(s) or drain site(s) healing without S/S of infection  Description: INTERVENTIONS  - Assess and document risk factors for skin impairment   - Assess and document dressing, incision, wound bed, drain sites and surrounding tissue  - Consider nutrition services referral as needed  - Oral mucous membranes remain intact  - Provide patient/ family education  9/1/2020 1128 by Eduardo Arguelles RN  Outcome: Progressing  9/1/2020 1127 by Eduardo Arguelles RN  Outcome: Progressing     Problem: PAIN - ADULT  Goal: Verbalizes/displays adequate comfort level or baseline comfort level  Description: Interventions:  - Encourage patient to monitor pain and request assistance  - Assess pain using appropriate pain scale  - Administer analgesics based on type and severity of pain and evaluate response  - Implement non-pharmacological measures as appropriate and evaluate response  - Consider cultural and social influences on pain and pain management  - Notify physician/advanced practitioner if interventions unsuccessful or patient reports new pain  Outcome: Progressing     Problem: SAFETY ADULT  Goal: Patient will remain free of falls  Description: INTERVENTIONS:  - Assess patient frequently for physical needs  -  Identify cognitive and physical deficits and behaviors that affect risk of falls    -  [place] fall precautions as indicated by assessment   - Educate patient/family on patient safety including physical limitations  - Instruct patient to call for assistance with activity based on assessment  - Modify environment to reduce risk of injury  - Consider OT/PT consult to assist with strengthening/mobility  Outcome: Progressing

## 2020-09-01 NOTE — PLAN OF CARE
Problem: BIRTH - VAGINAL/ SECTION  Goal: Fetal and maternal status remain reassuring during the birth process  Description: INTERVENTIONS:  - Monitor vital signs  - Monitor fetal heart rate  - Monitor uterine activity  - Monitor labor progression (vaginal delivery)      Outcome: Progressing  Goal: Emotionally satisfying birthing experience for mother/fetus  Description: Interventions:  - Assess, plan, implement and evaluate the nursing care given to the patient in labor  - Advocate the philosophy that each childbirth experience is a unique experience and support the family's chosen level of involvement and control during the labor process   - Actively participate in both the patient's and family's teaching of the birth process  - Consider cultural, [employer] and age-specific factors and plan care for the patient in labor  Outcome: Progressing

## 2020-09-01 NOTE — DISCHARGE INSTRUCTIONS
Self Care After Delivery   AMBULATORY CARE:   The postpartum period  is the period of time from delivery to about 6 weeks  During this time you may experience many physical and emotional changes  It is important to understand what is normal and when you need to call your healthcare provider  It is also important to know how to care for yourself during this time  Call 911 for any of the following:   · You soak through 1 pad in 15 minutes, have blurry vision, clammy or pale skin, and feel faint  · You faint or lose consciousness  · Your heart is beating faster than normal      · You have trouble breathing  · You cough up blood  Seek care immediately if:   · You soak through 1 or more pads in an hour, or pass blood clots larger than a quarter from your vagina  · Your leg is painful, red, and larger than normal      · You have severe abdominal pain  · You have a bad headache or changes in your vision  · Your episiotomy or C section incision is red, swollen, bleeding, or draining pus  · Your incision comes apart  · You see or hear things that are not there, or have thoughts of harming yourself or your baby  Contact your obstetrician or midwife if:   · You have a fever  · You have new or worsening pain in your abdomen or vagina  · You continue to have the baby blues 10 days after you deliver  · You have trouble sleeping  · You have foul-smelling discharge from your vagina  · You have pain or burning when you urinate  · You do not have a bowel movement for 3 days or more  · You have nausea or are vomiting  · You have hard lumps or red streaks over your breasts  · You have cracked nipples or bleed from your nipples  · You have questions or concerns about your condition or care  Physical changes:   The following are normal changes after you give birth:  · Pain in the area between your anus and vagina    · Breast pain    · Constipation or hemorrhoids    · Hot or cold flashes    · Vaginal bleeding or discharge    · Mild to moderate abdominal cramping    · Difficulty controlling bowel movements or urine  Emotional changes: The following are symptoms of the baby blues, or normal emotions after you give birth  The changes in your emotions may be caused by a drop in hormone levels after you deliver  If these symptoms last longer than 1 to 2 weeks after you give birth, you may have postpartum depression  · Feeling irritable    · Feeling sad    · Crying for no reason    · Feeling anxious  Breast care for nursing mothers: You may have sore breasts for 3 to 6 days after you give birth  This happens as your milk begins to fill your breasts  You may also have sore breasts if you do not breastfeed frequently  Do the following to care for your breasts:  · Apply a moist, warm, compress to your breast as directed  This may help soothe your breasts  Make sure the washcloth is not too hot before you apply it to your breast      · Nurse your baby or pump your milk frequently  This may prevent clogged milk ducts  Ask your healthcare provider how often to nurse or pump  · Massage your breasts as directed  This may help increase your milk flow  Gently rub your breasts in a circular motion before you breastfeed  You may need to gently squeeze your breast or nipple to help release milk  You can also use a breast pump to help release milk from your breast      · Wash your breasts with warm water only  Do not put soap on your nipples  Soap may cause your nipples to become dry  · Apply lanolin cream to your nipples as directed  Lanolin cream may add moisture to your skin and prevent nipple dryness  Always  wash off lanolin cream with warm water before you breastfeed  · Place pads in your bra  Your nipples may leak milk when you are not breastfeeding  You can place pads inside of your bra to help prevent leaking onto your clothing   Ask your healthcare provider where to purchase bra pads  · Get breastfeeding support if needed  There are healthcare providers who can answer questions about breastfeeding and provide you with support  Ask your healthcare provider who you can contact if you need breastfeeding support  Breast care for non-nursing mothers:  Milk will fill your breasts even if you bottle feed your baby  Do the following to help stop your milk from filling your breasts and causing pain:  · Wear a bra with support at all times  A sports bra or a tight-fitting bra will help stop your milk from coming in  · Apply ice on each breast for 15 to 20 minutes every hour or as directed  Use an ice pack, or put crushed ice in a plastic bag  Cover it with a towel  Ice helps your milk ducts shrink  · Keep your breasts away from warm water  Warm water will make it easier for milk to fill your breasts  Stand with your breasts away from warm water in the shower  · Limit how much you touch your breasts  This will prevent them from filling with milk  Perineum care: Your perineum is the area between your rectum and vagina  It is normal to have swelling and pain in this area after you give birth  If you had an episiotomy, your healthcare provider may give you special instructions  · Clean your perineum after you use the bathroom  This may prevent infection and help with healing  Use a spray bottle with warm water to clean your perineum  You may also gently spray warm water against your perineum when you urinate  Always wipe front to back  · Take a sitz bath as directed  A sitz bath may help relieve swelling and pain  Fill your bath tub or bucket with water up to your hips and sit in the water  Use cold water for 2 days after you deliver  Then use warm water  Ask your healthcare provider for more information about a sitz bath  · Apply ice packs for the first 24 hours or as directed  Use a plastic glove filled with ice or buy an ice pack   Wrap the ice pack or plastic glove in a small towel or wash cloth  Place the ice pack on your perineum for 20 minutes at a time  · Sit on a donut-shaped pillow  This may relieve pressure on your perineum when you sit  · Use wipes with medicine or take pills as directed  Your healthcare provider may tell you to use witch hazel pads  You can place witch hazel pads in the refrigerator before you apply them to your perineum  He may also tell you to take NSAIDs  Ask your healthcare provider how often to take pills or use wipes with medicine  · Do not go swimming or take tub baths for 4 to 6 weeks or as directed  This will help prevent an infection in your vagina or uterus  Bowel and bladder care: It may take 3 to 5 days to have a bowel movement after you deliver your baby  You can do the following to prevent or manage constipation, and get control of your bowel or bladder:  · Take stool softeners as directed  A stool softener is medicine that will make your bowel movements softer  This may prevent or relieve constipation  A stool softener may also make bowel movements less painful  · Drink plenty of liquids  Ask how much liquid to drink each day and which liquids are best for you  Liquids may help prevent constipation  · Eat foods high in fiber  Examples include fruits, vegetables, grains, beans, and lentils  Ask your healthcare provider how much fiber you need each day  Fiber may prevent constipation  · Do Kegel exercises as directed  Kegel exercises will help strengthen the muscles that control bowel movements and urination  Ask your healthcare provider for more information on Kegel exercises  · Apply cold compresses or medicine to hemorrhoids as directed  This may relieve swelling and pain  Your healthcare provider may tell you to apply ice or wipes with medicine to your hemorrhoids  He may also tell you to use a sitz bath   Ask your healthcare for more information on how to manage hemorrhoids  Nutrition:  Good nutrition is important in the postpartum period  It will help you return to a healthy weight, increase your energy levels, and prevent constipation  It will also help you get enough nutrients and calories if you are going to breastfeed your baby  · Eat a variety of healthy foods  Healthy foods include fruits, vegetables, whole-grain breads, low-fat dairy products, beans, lean meats, and fish  You may need 500 to 700 additional calories each day if you breastfeed your baby  You may also need extra protein  · Limit foods with added sugar and high amounts of fat  These foods are high in calories and low in healthy nutrients  Read food labels so you know how much sugar and fat is in the food you want to eat  · Drink 8 to 10 glasses of water per day  Water will help you make plenty of milk for your baby  It will also help prevent constipation  Drink a glass of water every time you breastfeed your baby  · Take vitamins as directed  Ask your healthcare provider what vitamins you need  · Limit caffeine and alcohol if you are breastfeeding  Caffeine and alcohol can get into your breast milk  Caffeine and alcohol can make your baby fussy  They can also interfere with your baby's sleep  Ask your healthcare provider if you can drink alcohol or caffeine  Rest and sleep: You may feel very tired in the postpartum period  Enough sleep will help you heal and give you energy to care for your baby  The following may help you get sleep and rest:  · Nap when your baby naps  Your baby may nap several times during the day  Get rest during this time  · Limit visitors  Many people may want to see you and your baby  Ask friends or family to visit on different days  This will give you time to rest      · Do not plan too much for one day  Put off household chores so that you have time to rest  Gradually do more each day  · Ask for help from family, friends, or neighbors    Ask them to help you with laundry, cleaning, or errands  Also ask someone to watch the baby while you take a nap or relax  Ask your partner to help with the care of your baby  Pump some of your breast milk so your partner can feed your baby during the night  Exercise after delivery:  Wait until your healthcare provider says it is okay to exercise  Exercise can help you lose weight, increase your energy levels, and manage your mood  It can also prevent constipation and blood clots  Start with gentle exercises such as walking  Do more as you have more energy  You may need to avoid abdominal exercises for 1 to 2 weeks after you deliver  Talk to your healthcare provider about an exercise plan that is right for you  Sexual activity after delivery:   · Do not have sex until your healthcare provider says it is okay  You may need to wait 4 to 6 weeks before you have sex  This may prevent infection and allow time to heal      · Your menstrual cycle may begin as soon as 3 weeks after you deliver  Your period may be delayed if you breastfeed your baby  You can become pregnant before you get your first postpartum period  Talk to your healthcare provider about birth control that is right for you  Some types of birth control are not safe during breastfeeding  For support and more information:  Join a support group for new mothers  Ask for help from family and friends with chores, errands, and care of your baby  · Office of Women's Health,  Department of Health and Human Services  [address], [address] [place] [address]  [address], [address] [place]  [place] , [address]  Phone: 2- 566 - [phone]  Web Address: www womenshealth gov  · [facility] [place] , [address] [place] [address]  [address]  Web Address: [employer]  org/pregnancy/postpartum-care  aspx  Follow up with your obstetrician or midwife as directed:   You will need to follow up with your healthcare provider in 2 to 6 weeks after delivery  Write down your questions so you remember to ask them at your visits  © 2017 [address] Information is for End User's use only and may not be sold, redistributed or otherwise used for commercial purposes  All illustrations and images included in CareNotes® are the copyrighted property of A D A M , Inc  or Manolo Donaldson  The above information is an  only  It is not intended as medical advice for individual conditions or treatments  Talk to your doctor, nurse or pharmacist before following any medical regimen to see if it is safe and effective for you

## 2020-09-01 NOTE — PLAN OF CARE
Problem: BIRTH - VAGINAL/ SECTION  Goal: Fetal and maternal status remain reassuring during the birth process  Description: INTERVENTIONS:  - Monitor vital signs  - Monitor fetal heart rate  - Monitor uterine activity  - Monitor labor progression (vaginal delivery)      Outcome: Progressing  Goal: Emotionally satisfying birthing experience for mother/fetus  Description: Interventions:  - Assess, plan, implement and evaluate the nursing care given to the patient in labor  - Advocate the philosophy that each childbirth experience is a unique experience and support the family's chosen level of involvement and control during the labor process   - Actively participate in both the patient's and family's teaching of the birth process  - Consider cultural, Holiness and age-specific factors and plan care for the patient in labor  Outcome: Progressing     Problem: Knowledge Deficit  Goal: Verbalizes understanding of labor plan  Description: Assess patient/family/caregiver's baseline knowledge level and ability to understand information  Provide education via patient/family/caregiver's preferred learning method at appropriate level of understanding  1  Provide teaching at level of understanding  2  Provide teaching via preferred learning method(s)  Outcome: Progressing     Problem: Labor & Delivery  Goal: Manages discomfort  Description: Assess and monitor for signs and symptoms of discomfort  Assess patient's pain level regularly and per hospital policy  Administer medications as ordered  Support use of nonpharmacological methods to help control pain such as distraction, imagery, relaxation, and application of heat and cold  Collaborate with interdisciplinary team and patient to determine appropriate pain management plan  1  Include patient in decisions related to comfort  2  Offer non-pharmacological pain management interventions  3  Report ineffective pain management to physician    Outcome: Progressing  Goal: Patient vital signs are stable  Description: 1  Assess vital signs - vaginal delivery    Outcome: Progressing     Problem: DISCHARGE PLANNING - CARE MANAGEMENT  Goal: Discharge to post-acute care or home with appropriate resources  Description: INTERVENTIONS:  - Conduct assessment to determine patient/family and health care team treatment goals, and need for post-acute services based on payer coverage, community resources, and patient preferences, and barriers to discharge  - Address psychosocial, clinical, and financial barriers to discharge as identified in assessment in conjunction with the patient/family and health care team  - Arrange appropriate level of post-acute services according to patient's   needs and preference and payer coverage in collaboration with the physician and health care team  - Communicate with and update the patient/family, physician, and health care team regarding progress on the discharge plan    Outcome: Progressing     Problem: POSTPARTUM  Goal: Establishment of infant feeding pattern  Description: INTERVENTIONS:  - Assess breast/bottle feeding  - Refer to lactation as needed  Outcome: Progressing  Goal: Incision(s), wounds(s) or drain site(s) healing without S/S of infection  Description: INTERVENTIONS  - Assess and document risk factors for skin impairment   - Assess and document dressing, incision, wound bed, drain sites and surrounding tissue  - Consider nutrition services referral as needed  - Oral mucous membranes remain intact  - Provide patient/ family education  Outcome: Progressing

## 2020-09-01 NOTE — LACTATION NOTE
This note was copied from a baby's chart  CONSULT - LACTATION  Baby Boy Ashlyn Phleaarti) David 0 days male MRN: [id number]    [address] Room / Bed:  302(N)/ 302(N) Encounter: [id number]    Maternal Information     MOTHER:  Tamera Hernandez  Maternal Age: 31 y o    OB History: # 1 - Date: [birth year], Sex: Male, Weight: 3920 g (8 lb 10 3 oz), GA: 41w1d, Delivery: Vaginal, Spontaneous, Apgar1: 7, Apgar5: 8, Living: Living, Birth Comments: None    # 2 - Date: 09/01/20, Sex: Male, Weight: 4135 g (9 lb 1 9 oz), GA: 41w0d, Delivery: Vaginal, Spontaneous, Apgar1: 9, Apgar5: 9, Living: Living, Birth Comments: None   Previouse breast reduction surgery? No    Lactation history:   Has patient previously breast fed: Yes   How long had patient previously breast fed: 15 mo   Previous breast feeding complications: None     Past Surgical History:   Procedure Laterality Date    VAGINAL DELIVERY          Birth information:  YOB: 2021   Time of birth: 1:20 AM   Sex: male   Delivery type: Vaginal, Spontaneous   Birth Weight: 4135 g (9 lb 1 9 oz)   Percent of Weight Change: 0%     Gestational Age: 37w0d   [unfilled]    No clinical assessment completed   Feeding recommendations:  breast feed on demand     Met with mother  Provided mother with Ready, Set, Baby booklet  Discussed Skin to Skin contact an benefits to mom and baby  Talked about the delay of the first bath until baby has adjusted  Spoke about the benefits of rooming in  Feeding on cue and what that means for recognizing infant's hunger  Avoidance of pacifiers for the first month discussed  Talked about exclusive breastfeeding for the first 6 months  Positioning and latch reviewed as well as showing images of other feeding positions  Discussed the properties of a good latch in any position  Reviewed hand/manual expression    Discussed s/s that baby is getting enough milk and some s/s that breastfeeding dyad may need further help  Gave information on common concerns, what to expect the first few weeks after delivery, preparing for other caregivers, and how partners can help  Resources for support also provided  Information on hand expression given  Discussed benefits of knowing how to manually express breast including stimulating milk supply, softening nipple for latch and evacuating breast in the event of engorgement  Encouraged parents to call for assistance, questions, and concerns about breastfeeding  Extension provided      [place], [address] [place] 9/1/2020 12:37 PM

## 2020-09-01 NOTE — ANESTHESIA PROCEDURE NOTES
Epidural Block    Patient location during procedure: OB  Start time: 8/31/2020 10:25 PM  Reason for block: procedure for pain and at surgeon's request  Staffing  Anesthesiologist: Claudell Dearth, MD  Performed: anesthesiologist   Preanesthetic Checklist  Completed: patient identified, surgical consent, pre-op evaluation, timeout performed, IV checked, risks and benefits discussed and monitors and equipment checked  Epidural  Patient position: sitting  Prep: ChloraPrep and site prepped and draped  Patient monitoring: cardiac monitor, frequent blood pressure checks, continuous pulse ox and heart rate  Approach: midline  Location: lumbar (1-5)  Injection technique: SALOME air  Needle  Needle type: Tuohy   Needle gauge: 18 G  Catheter type: side hole  Catheter size: 20 G  Catheter at skin depth: 13 cm  Test dose: negative  Assessment  Sensory level: [id number] aspiration for CSF, negative aspiration for heme and no paresthesia on injection  patient tolerated the procedure well with no immediate complications

## 2020-09-01 NOTE — OB LABOR/OXYTOCIN SAFETY PROGRESS
Oxytocin Safety Progress Check Note - Jacob Samuels 31 y o  female MRN: [id number]    Unit/Bed#: -01 Encounter: [id number]    Dose (ector-units/min) Oxytocin: 4 ector-units/min  Contraction Frequency (minutes): 2-4  Contraction Quality: Moderate  Tachysystole: No   Cervical Dilation: Lip/rim (Comment)        Cervical Effacement: 100  Fetal Station: 0  Baseline Rate: 115 bpm  Fetal Heart Rate: 140 BPM  FHR Category: Category I               Vital Signs:   Vitals:    20 0040   BP: 113/64   Pulse: 81   Resp:    Temp:    SpO2:            Notes/comments:    Pt with FHT with possible change in baseline to 100s  SVE noted to be as above  Pt made rapid cervical change  FHT with moderate variability and accels  Nursing repositioning patient  Dr Marty Huang aware and en route   Anticipate      Pako Wilkerson MD 2020 12:58 AM

## 2020-09-02 VITALS
BODY MASS INDEX: 33.43 KG/M2 | SYSTOLIC BLOOD PRESSURE: 119 MMHG | HEART RATE: 85 BPM | RESPIRATION RATE: 20 BRPM | DIASTOLIC BLOOD PRESSURE: 75 MMHG | TEMPERATURE: 98.2 F | WEIGHT: 213 LBS | OXYGEN SATURATION: 97 % | HEIGHT: 67 IN

## 2020-09-02 LAB — HIV 1+2 AB+HIV1 P24 AG SERPL QL IA: NORMAL

## 2020-09-02 PROCEDURE — 99024 POSTOP FOLLOW-UP VISIT: CPT | Performed by: OBSTETRICS & GYNECOLOGY

## 2020-09-02 RX ORDER — IBUPROFEN 600 MG/1
600 TABLET ORAL EVERY 6 HOURS PRN
Qty: 30 TABLET | Refills: 0 | Status: SHIPPED | OUTPATIENT
Start: 2020-09-02 | End: 2021-01-16

## 2020-09-02 RX ORDER — ACETAMINOPHEN 325 MG/1
650 TABLET ORAL EVERY 6 HOURS PRN
Qty: 30 TABLET | Refills: 0 | Status: SHIPPED | OUTPATIENT
Start: 2020-09-02 | End: 2021-01-16

## 2020-09-02 RX ADMIN — Medication 1 TABLET: at 08:35

## 2020-09-02 NOTE — LACTATION NOTE
This note was copied from a baby's chart  Met with mother to go over discharge breastfeeding booklet including the feeding log  Emphasized 8 or more (12) feedings in a 24 hour period, what to expect for the number of diapers per day of life and the progression of properties of the  stooling pattern  Reviewed breastfeeding and your lifestyle, storage and preparation of breast milk, how to keep you breast pump clean, the employed breastfeeding mother and paced bottle feeding handouts  Booklet included Breastfeeding Resources for after discharge including access to the number for the [address]  Discussed s/s engorgement and how to manage with medications and cool compresses as well as s/s mastitis and when to contact physician  Baby is latched properly and active at the breast at this time  Enc Mom to contact [address] for lactation support after DC

## 2020-09-02 NOTE — PLAN OF CARE
Problem: DISCHARGE PLANNING - CARE MANAGEMENT  Goal: Discharge to post-acute care or home with appropriate resources  Description: INTERVENTIONS:  - Conduct assessment to determine patient/family and health care team treatment goals, and need for post-acute services based on payer coverage, community resources, and patient preferences, and barriers to discharge  - Address psychosocial, clinical, and financial barriers to discharge as identified in assessment in conjunction with the patient/family and health care team  - Arrange appropriate level of post-acute services according to patient's   needs and preference and payer coverage in collaboration with the physician and health care team  - Communicate with and update the patient/family, physician, and health care team regarding progress on the discharge plan    Outcome: Completed     Problem: POSTPARTUM  Goal: Experiences normal postpartum course  Description: INTERVENTIONS:  - Monitor maternal vital signs  - Assess uterine involution and lochia  Outcome: Completed  Goal: Appropriate maternal -  bonding  Description: INTERVENTIONS:  - Identify family support  - Assess for appropriate maternal/infant bonding   -Encourage maternal/infant bonding opportunities  - Referral to  or  as needed  Outcome: Completed  Goal: Establishment of infant feeding pattern  Description: INTERVENTIONS:  - Assess breast/bottle feeding  - Refer to lactation as needed  Outcome: Completed  Goal: Incision(s), wounds(s) or drain site(s) healing without S/S of infection  Description: INTERVENTIONS  - Assess and document risk factors for skin impairment   - Assess and document dressing, incision, wound bed, drain sites and surrounding tissue  - Consider nutrition services referral as needed  - Oral mucous membranes remain intact  - Provide patient/ family education  Outcome: Completed     Problem: PAIN - ADULT  Goal: Verbalizes/displays adequate comfort level or baseline comfort level  Description: Interventions:  - Encourage patient to monitor pain and request assistance  - Assess pain using appropriate pain scale  - Administer analgesics based on type and severity of pain and evaluate response  - Implement non-pharmacological measures as appropriate and evaluate response  - Consider cultural and social influences on pain and pain management  - Notify physician/advanced practitioner if interventions unsuccessful or patient reports new pain  Outcome: Completed     Problem: SAFETY ADULT  Goal: Patient will remain free of falls  Description: INTERVENTIONS:  - Assess patient frequently for physical needs  -  Identify cognitive and physical deficits and behaviors that affect risk of falls    -  [place] fall precautions as indicated by assessment   - Educate patient/family on patient safety including physical limitations  - Instruct patient to call for assistance with activity based on assessment  - Modify environment to reduce risk of injury  - Consider OT/PT consult to assist with strengthening/mobility  Outcome: Completed

## 2020-09-02 NOTE — PLAN OF CARE
Problem: DISCHARGE PLANNING - CARE MANAGEMENT  Goal: Discharge to post-acute care or home with appropriate resources  Description: INTERVENTIONS:  - Conduct assessment to determine patient/family and health care team treatment goals, and need for post-acute services based on payer coverage, community resources, and patient preferences, and barriers to discharge  - Address psychosocial, clinical, and financial barriers to discharge as identified in assessment in conjunction with the patient/family and health care team  - Arrange appropriate level of post-acute services according to patient's   needs and preference and payer coverage in collaboration with the physician and health care team  - Communicate with and update the patient/family, physician, and health care team regarding progress on the discharge plan    Outcome: Progressing     Problem: POSTPARTUM  Goal: Experiences normal postpartum course  Description: INTERVENTIONS:  - Monitor maternal vital signs  - Assess uterine involution and lochia  Outcome: Progressing  Goal: Appropriate maternal -  bonding  Description: INTERVENTIONS:  - Identify family support  - Assess for appropriate maternal/infant bonding   -Encourage maternal/infant bonding opportunities  - Referral to  or  as needed  Outcome: Progressing  Goal: Establishment of infant feeding pattern  Description: INTERVENTIONS:  - Assess breast/bottle feeding  - Refer to lactation as needed  Outcome: Progressing  Goal: Incision(s), wounds(s) or drain site(s) healing without S/S of infection  Description: INTERVENTIONS  - Assess and document risk factors for skin impairment   - Assess and document dressing, incision, wound bed, drain sites and surrounding tissue  - Consider nutrition services referral as needed  - Oral mucous membranes remain intact  - Provide patient/ family education  Outcome: Progressing     Problem: PAIN - ADULT  Goal: Verbalizes/displays adequate comfort level or baseline comfort level  Description: Interventions:  - Encourage patient to monitor pain and request assistance  - Assess pain using appropriate pain scale  - Administer analgesics based on type and severity of pain and evaluate response  - Implement non-pharmacological measures as appropriate and evaluate response  - Consider cultural and social influences on pain and pain management  - Notify physician/advanced practitioner if interventions unsuccessful or patient reports new pain  Outcome: Progressing     Problem: SAFETY ADULT  Goal: Patient will remain free of falls  Description: INTERVENTIONS:  - Assess patient frequently for physical needs  -  Identify cognitive and physical deficits and behaviors that affect risk of falls    -  [place] fall precautions as indicated by assessment   - Educate patient/family on patient safety including physical limitations  - Instruct patient to call for assistance with activity based on assessment  - Modify environment to reduce risk of injury  - Consider OT/PT consult to assist with strengthening/mobility  Outcome: Progressing

## 2020-09-02 NOTE — PROGRESS NOTES
Progress Note - OB/GYN   Km Cortez 31 y o  female MRN: [id number]  Unit/Bed#:  302-01 Encounter: [id number]    Assessment:  Post partum Day #1 s/p , stable, baby in room    Plan:  1) Continue routine post partum care   Encourage ambulation   Encourage breastfeeding   Anticipate discharge today     2) Contraception  Undecided - will discuss at postpartum visit    Subjective/Objective   Chief Complaint:     Post delivery  Patient is doing well  Lochia Within normal limits  Pain well controlled with current regimen of oral analgesics  Subjective:     Pain: yes, cramping, improved with meds  Tolerating PO: yes  Voiding: yes  Flatus: yes  Ambulating: yes  Chest pain: no  Shortness of breath: no  Leg pain: no  Lochia: minimal  Infant feeding: breastmilk    Objective:     Vitals: /59 (BP Location: Left arm)   Pulse 72   Temp 98 1 °F (36 7 °C) (Oral)   Resp 18   Ht 5' 7" (1 702 m)   Wt 96 6 kg (213 lb)   LMP 2019   SpO2 97%   Breastfeeding Yes   BMI 33 36 kg/m²       Intake/Output Summary (Last 24 hours) at 2020 0551  Last data filed at 2020 0945  Gross per 24 hour   Intake --   Output 1200 ml   Net -1200 ml       Lab Results   Component Value Date    WBC 8 33 2020    HGB 12 3 2020    HCT 36 5 2020    MCV 91 2020     2020       Physical Exam:     Gen: AAOx3, NAD  CV: RRR  Lungs: CTA b/l  Abd: Soft, non-tender, non-distended, no rebound or guarding  Uterine fundus firm and non-tender, 2 cm below the umbilicus     Ext: Non tender    Kishore Harvey MD  2020  5:51 AM

## 2020-09-08 LAB — PLACENTA IN STORAGE: NORMAL

## 2020-09-23 ENCOUNTER — POSTPARTUM VISIT (OUTPATIENT)
Dept: OBGYN CLINIC | Facility: CLINIC | Age: 25
End: 2020-09-23

## 2020-09-23 VITALS
HEIGHT: 67 IN | BODY MASS INDEX: 29.03 KG/M2 | WEIGHT: 185 LBS | SYSTOLIC BLOOD PRESSURE: 120 MMHG | DIASTOLIC BLOOD PRESSURE: 74 MMHG

## 2020-09-23 PROCEDURE — 99024 POSTOP FOLLOW-UP VISIT: CPT | Performed by: PHYSICIAN ASSISTANT

## 2020-09-23 NOTE — PROGRESS NOTES
Assessment/Plan:    No problem-specific Assessment & Plan notes found for this encounter  There are no diagnoses linked to this encounter  Subjective:      Patient ID: Rocio Olivares is a 31 y o  female  Pt presents today for her 3-week PPV, lelia  She has no complaints  Bleeding is minimal  No pain  Bowel and bladder are ok  Breastfeeding going well  Getting some sleep  Mood is good  Ppd score is 0      The following portions of the patient's history were reviewed and updated as appropriate: allergies, current medications, past family history, past medical history, past social history, past surgical history and problem list     Review of Systems   Constitutional: Negative for chills, fever and unexpected weight change  Gastrointestinal: Negative for abdominal pain, blood in stool, constipation and diarrhea  Genitourinary: Negative  Objective:      /74   Ht 5' 7" (1 702 m)   Wt 83 9 kg (185 lb)   LMP 11/19/2019   Breastfeeding Yes   BMI 28 98 kg/m²          Physical Exam  Vitals signs and nursing note reviewed

## 2020-09-23 NOTE — PROGRESS NOTES
Patient is here for 3 week post partum visit  Patient is nursing, and going well       Baby boy David Lamb

## 2020-10-05 ENCOUNTER — TELEPHONE (OUTPATIENT)
Dept: OBGYN CLINIC | Facility: CLINIC | Age: 25
End: 2020-10-05

## 2020-10-12 ENCOUNTER — POSTPARTUM VISIT (OUTPATIENT)
Dept: OBGYN CLINIC | Facility: CLINIC | Age: 25
End: 2020-10-12

## 2020-10-12 VITALS
BODY MASS INDEX: 27.47 KG/M2 | DIASTOLIC BLOOD PRESSURE: 80 MMHG | SYSTOLIC BLOOD PRESSURE: 130 MMHG | WEIGHT: 175 LBS | HEIGHT: 67 IN

## 2020-10-12 PROCEDURE — 99024 POSTOP FOLLOW-UP VISIT: CPT | Performed by: OBSTETRICS & GYNECOLOGY

## 2020-10-26 ENCOUNTER — OFFICE VISIT (OUTPATIENT)
Dept: OBGYN CLINIC | Facility: CLINIC | Age: 25
End: 2020-10-26
Payer: COMMERCIAL

## 2020-10-26 VITALS
DIASTOLIC BLOOD PRESSURE: 70 MMHG | HEIGHT: 66 IN | WEIGHT: 174 LBS | BODY MASS INDEX: 27.97 KG/M2 | SYSTOLIC BLOOD PRESSURE: 120 MMHG

## 2020-10-26 DIAGNOSIS — N93.9 ABNORMAL UTERINE BLEEDING (AUB): Primary | ICD-10-CM

## 2020-10-26 PROCEDURE — 99213 OFFICE O/P EST LOW 20 MIN: CPT | Performed by: OBSTETRICS & GYNECOLOGY

## 2020-10-26 RX ORDER — MEDROXYPROGESTERONE ACETATE 10 MG/1
10 TABLET ORAL DAILY
Qty: 10 TABLET | Refills: 0 | Status: SHIPPED | OUTPATIENT
Start: 2020-10-26 | End: 2021-01-16

## 2020-11-05 ENCOUNTER — TELEPHONE (OUTPATIENT)
Dept: OBGYN CLINIC | Facility: CLINIC | Age: 25
End: 2020-11-05

## 2020-11-12 ENCOUNTER — TELEPHONE (OUTPATIENT)
Dept: OBGYN CLINIC | Facility: CLINIC | Age: 25
End: 2020-11-12

## 2021-01-04 ENCOUNTER — TELEPHONE (OUTPATIENT)
Dept: OBGYN CLINIC | Facility: CLINIC | Age: 26
End: 2021-01-04

## 2021-01-04 NOTE — TELEPHONE ENCOUNTER
Pt states she has light bleeding during the week and heavier on weekends since finishing the provera that was prescribed to her in November  Dr told her to observe and call with and update

## 2021-01-04 NOTE — TELEPHONE ENCOUNTER
Pt called with an update  She still is having light bleeding during the week when she is at work and heavier on weekends when she is more active  Today she states that bleeding is a little heavier and she passed a clot the size of a quarter and mild cramping

## 2021-01-05 ENCOUNTER — TRANSCRIBE ORDERS (OUTPATIENT)
Dept: OBGYN CLINIC | Facility: CLINIC | Age: 26
End: 2021-01-05

## 2021-01-05 DIAGNOSIS — N93.9 ABNORMAL UTERINE BLEEDING (AUB): Primary | ICD-10-CM

## 2021-01-14 ENCOUNTER — PREP FOR PROCEDURE (OUTPATIENT)
Dept: OBGYN CLINIC | Facility: CLINIC | Age: 26
End: 2021-01-14

## 2021-01-14 ENCOUNTER — OFFICE VISIT (OUTPATIENT)
Dept: OBGYN CLINIC | Facility: CLINIC | Age: 26
End: 2021-01-14
Payer: COMMERCIAL

## 2021-01-14 VITALS
SYSTOLIC BLOOD PRESSURE: 130 MMHG | WEIGHT: 168 LBS | DIASTOLIC BLOOD PRESSURE: 84 MMHG | BODY MASS INDEX: 26.37 KG/M2 | HEIGHT: 67 IN

## 2021-01-14 DIAGNOSIS — N93.9 ABNORMAL UTERINE BLEEDING (AUB): Primary | ICD-10-CM

## 2021-01-14 PROCEDURE — 99213 OFFICE O/P EST LOW 20 MIN: CPT | Performed by: OBSTETRICS & GYNECOLOGY

## 2021-01-14 PROCEDURE — NC001 PR NO CHARGE: Performed by: OBSTETRICS & GYNECOLOGY

## 2021-01-14 RX ORDER — MULTIVIT WITH MINERALS/LUTEIN
1000 TABLET ORAL DAILY
COMMUNITY

## 2021-01-14 NOTE — PROGRESS NOTES
The patient is here to discuss ultrasound results from 1/8/2021  The patient started having bright red bleeding  yesterday  No pelvic pain

## 2021-01-14 NOTE — PROGRESS NOTES
Patient presents to the office for continued abnormal uterine bleeding  Patient delivered spontaneously in August 2020  Her postop bleeding diminished greatly but she has continued to have intermittent spotting  An ultrasound done on January 8, 2021 showed the uterus to be 8 x 4 3 x 5 4 cm with 9 mm endometrial stripe a 2 times 0 7 x 1 2 cm area with calcification was noted in the uterus possibly consistent with a retained placenta  Patient states she started to bleed heavy like a regular period yesterday and passed a medium size clot  Today her bleeding is consistent without clots  Physical exam:  Abdomen soft nontender  Vagina bright red blood from cervical os no clot seen  Uterus normal size mobile nontender  No adnexal masses  Impression:  Abnormal uterine bleeding following vaginal delivery in August 2020  Possible retained placental piece  Plan:  Observe bleeding over the next couple days  Call with update  Check pelvic ultrasound on Monday  If bleeding resolves and ultrasound is normal no further intervention  If patient continues to bleed and ultrasound shows this retained placenta, will proceed with hysteroscopy D&C next Tuesday 1/19  Consent for surgery signed    Consent stated hysteroscopy D&C endometrial biopsy for AUB possible retained placenta

## 2021-01-14 NOTE — H&P
H&P Exam - Gynecology   Amanda Kramer 31 y o  female MRN: [id number]  Unit/Bed#:  Encounter: [id number]    Assessment/Plan     Assessment:  AUB, possible retained placenta  Plan:  Hysteroscopy D&C        HPI:  Amanda Kramer is a 31 y  o  female who presents with continued vaginal bleeding since delivery 2020  Patient states her bleeding diminished but started up again with continue spotting and small clots  Patient denies any pelvic pain  An ultrasound done 2021 showed the uterus to be 8 x 4 3 x 5 4 cm with a 9 mm endometrial stripe with a 2 x 0 7 x 1 2 cm area with calcification possibly consistent with retained placental tissue  Review of Systems   Constitutional: Negative  Negative for fatigue, fever and unexpected weight change  HENT: Negative  Eyes: Negative  Respiratory: Negative  Negative for chest tightness, shortness of breath, wheezing and stridor  Cardiovascular: Negative  Negative for chest pain, palpitations and leg swelling  Gastrointestinal: Negative  Negative for abdominal pain, blood in stool, diarrhea, nausea, rectal pain and vomiting  Endocrine: Negative  Genitourinary: Negative for dysuria, frequency, vaginal discharge and vaginal pain  Musculoskeletal: Negative  Skin: Negative  Allergic/Immunologic: Negative  Neurological: Negative  Hematological: Negative  Psychiatric/Behavioral: Negative  All other systems reviewed and are negative        Historical Information   Past Medical History:   Diagnosis Date    Papanicolaou smear     NEG    Varicella     childhood     Past Surgical History:   Procedure Laterality Date    VAGINAL DELIVERY       OB/GYN History:   x2  Family History   Problem Relation Age of Onset    Cancer Paternal Grandmother         breast    Early death Paternal Grandmother     Cancer Paternal Grandfather         parotid    Polycystic ovary syndrome Mother     No Known Problems Father     No Known Problems Sister     No Known Problems Brother     No Known Problems Son     Cancer Maternal Grandmother         Leukemia    Cancer Maternal Grandfather         pancreatic/prostate    Stroke Maternal Grandfather     No Known Problems Brother     No Known Problems Brother      Social History   Social History     Substance and Sexual Activity   Alcohol Use Not Currently    Comment: Social     Social History     Substance and Sexual Activity   Drug Use No     Social History     Tobacco Use   Smoking Status Never Smoker   Smokeless Tobacco Never Used     E-Cigarette/Vaping    E-Cigarette Use Never User      E-Cigarette/Vaping Substances       Meds/Allergies   all current active meds have been reviewed  Allergies   Allergen Reactions    Poison Ivy Extract Itching    Cashew Nut Oil Hives    Pistachio Nut (Diagnostic) Hives       Objective   Vitals: currently breastfeeding  No intake or output data in the 24 hours ending 01/14/21 1656    Invasive Devices: Invasive Devices     None                 Physical Exam  HENT:      Head: Normocephalic  Nose: Nose normal    Eyes:      Pupils: Pupils are equal, round, and reactive to light  Neck:      Musculoskeletal: Normal range of motion and neck supple  Cardiovascular:      Rate and Rhythm: Normal rate and regular rhythm  Pulses: Normal pulses  Heart sounds: Normal heart sounds  No murmur  Pulmonary:      Effort: Pulmonary effort is normal       Breath sounds: Normal breath sounds  Abdominal:      General: Abdomen is flat  Palpations: Abdomen is soft  Genitourinary:     Comments: External genitalia normal   Vagina with minimal bright red blood  Cervix no lesions  Uterus normal size nontender mobile no adnexal masses  Musculoskeletal: Normal range of motion  Skin:     General: Skin is warm  Neurological:      General: No focal deficit present  Mental Status: She is alert     Psychiatric:         Mood and Affect: Mood normal  Behavior: Behavior normal          Thought Content: Thought content normal          Lab Results: I have personally reviewed pertinent reports  Imaging: I have personally reviewed pertinent reports  EKG, Pathology, and Other Studies: I have personally reviewed pertinent reports

## 2021-01-16 ENCOUNTER — TELEPHONE (OUTPATIENT)
Dept: OTHER | Facility: OTHER | Age: 26
End: 2021-01-16

## 2021-01-16 NOTE — PRE-PROCEDURE INSTRUCTIONS
Pre-Surgery Instructions:   Medication Instructions    Ascorbic Acid (vitamin C) 1000 MG tablet stop 7 days prior    Ferrous Sulfate (IRON PO) stop 7 days prior    NON FORMULARY stop 7 days prior    Prenatal w/o A Vit-Fe Fum-FA (PRENATA PO) stop 7 days prior      Spoke to pt  Med list reviewed & instructed as above  As of 1/16 instructed no NSAIDs, tylenol only  Am DOS no medications  Showering instructions provided @ time of call   Negative COVID screening, outpt sx   Reviewed visitation policy   Advised no alcohol 24 hour prior, pt aware   All instructions verbally understood by patient   All questions answered

## 2021-01-16 NOTE — TELEPHONE ENCOUNTER
[phone]/HDYHWUR [facility]/[birth year]/The bleeding has reduced, but has not stopped completely  Everything else is the same    4:10 PM

## 2021-01-18 ENCOUNTER — HOSPITAL ENCOUNTER (OUTPATIENT)
Dept: ULTRASOUND IMAGING | Facility: HOSPITAL | Age: 26
Discharge: HOME | End: 2021-01-18
Attending: OBSTETRICS & GYNECOLOGY
Payer: COMMERCIAL

## 2021-01-18 ENCOUNTER — ANESTHESIA EVENT (OUTPATIENT)
Dept: PERIOP | Facility: HOSPITAL | Age: 26
End: 2021-01-18
Payer: COMMERCIAL

## 2021-01-18 DIAGNOSIS — N93.9 ABNORMAL UTERINE BLEEDING (AUB): ICD-10-CM

## 2021-01-18 PROBLEM — Z3A.40 40 WEEKS GESTATION OF PREGNANCY: Status: RESOLVED | Noted: 2018-03-04 | Resolved: 2021-01-18

## 2021-01-18 PROCEDURE — 76856 US EXAM PELVIC COMPLETE: CPT

## 2021-01-18 PROCEDURE — 76830 TRANSVAGINAL US NON-OB: CPT

## 2021-01-18 NOTE — ANESTHESIA PREPROCEDURE EVALUATION
Procedure:  DILATATION AND CURETTAGE (D&C) WITH HYSTEROSCOPY with  endometrial biopsy (N/A Uterus)    Relevant Problems   ANESTHESIA (within normal limits)  Patient has never had prior anesthesia however denies any family history of anesthetic reactions  CARDIO (within normal limits)      ENDO (within normal limits)      GI/HEPATIC (within normal limits)      /RENAL (within normal limits)      GYN   (-) Currently pregnant      HEMATOLOGY (within normal limits)      MUSCULOSKELETAL (within normal limits)      NEURO/PSYCH (within normal limits)      PULMONARY (within normal limits)        Physical Exam    Airway    Mallampati score: I  TM Distance: >3 FB  Neck ROM: full     Dental   No notable dental hx     Cardiovascular  Rhythm: regular, Rate: normal, Cardiovascular exam normal    Pulmonary  Pulmonary exam normal Breath sounds clear to auscultation,     Other Findings        Anesthesia Plan  ASA Score- 1     Anesthesia Type- general with ASA Monitors  Additional Monitors:   Airway Plan: LMA  Plan Factors-Exercise tolerance (METS): >4 METS  Chart reviewed  Imaging results reviewed  Existing labs reviewed  Patient summary reviewed  Patient is not a current smoker  Patient did not smoke on day of surgery  Induction- intravenous  Postoperative Plan-   Planned trial extubation    Informed Consent- Anesthetic plan and risks discussed with patient and spouse  I personally reviewed this patient with the CRNA  Discussed and agreed on the Anesthesia Plan with the CRNA           NPO and allergies verified  Urine pregnancy test negative today, 1/19/21  Plan:  GA, LMA    Risks and benefits of general anesthesia were discussed with the patient  Discussed risks of anesthesia including, but not limited to, the risk of dental injury, n/v, sore throat, corneal abrasions, and arrhythmias  All questions were answered  Anesthesia consent was obtained from the patient

## 2021-01-18 NOTE — TELEPHONE ENCOUNTER
Spoke with pt  She was advised about her u/s results and that she will still be having surgery tomorrow in [facility]

## 2021-01-19 ENCOUNTER — HOSPITAL ENCOUNTER (OUTPATIENT)
Facility: HOSPITAL | Age: 26
Setting detail: OUTPATIENT SURGERY
Discharge: HOME | End: 2021-01-19
Attending: OBSTETRICS & GYNECOLOGY | Admitting: OBSTETRICS & GYNECOLOGY
Payer: COMMERCIAL

## 2021-01-19 ENCOUNTER — ANESTHESIA (OUTPATIENT)
Dept: PERIOP | Facility: HOSPITAL | Age: 26
End: 2021-01-19
Payer: COMMERCIAL

## 2021-01-19 VITALS — HEART RATE: 82 BPM

## 2021-01-19 VITALS
HEIGHT: 67 IN | TEMPERATURE: 97.5 F | HEART RATE: 76 BPM | OXYGEN SATURATION: 96 % | WEIGHT: 168 LBS | DIASTOLIC BLOOD PRESSURE: 68 MMHG | RESPIRATION RATE: 18 BRPM | BODY MASS INDEX: 26.37 KG/M2 | SYSTOLIC BLOOD PRESSURE: 115 MMHG

## 2021-01-19 DIAGNOSIS — N93.9 ABNORMAL UTERINE BLEEDING (AUB): ICD-10-CM

## 2021-01-19 PROBLEM — Z98.890 S/P DILATION AND CURETTAGE: Status: ACTIVE | Noted: 2021-01-19

## 2021-01-19 LAB
EXT PREGNANCY TEST URINE: NEGATIVE
EXT. CONTROL: NORMAL

## 2021-01-19 PROCEDURE — 88305 TISSUE EXAM BY PATHOLOGIST: CPT | Performed by: PATHOLOGY

## 2021-01-19 PROCEDURE — 58558 HYSTEROSCOPY BIOPSY: CPT | Performed by: OBSTETRICS & GYNECOLOGY

## 2021-01-19 PROCEDURE — 81025 URINE PREGNANCY TEST: CPT | Performed by: OBSTETRICS & GYNECOLOGY

## 2021-01-19 RX ORDER — ONDANSETRON 2 MG/ML
4 INJECTION INTRAMUSCULAR; INTRAVENOUS EVERY 6 HOURS PRN
Status: DISCONTINUED | OUTPATIENT
Start: 2021-01-19 | End: 2021-01-19 | Stop reason: HOSPADM

## 2021-01-19 RX ORDER — LIDOCAINE HYDROCHLORIDE 10 MG/ML
0.5 INJECTION, SOLUTION EPIDURAL; INFILTRATION; INTRACAUDAL; PERINEURAL ONCE AS NEEDED
Status: COMPLETED | OUTPATIENT
Start: 2021-01-19 | End: 2021-01-19

## 2021-01-19 RX ORDER — ONDANSETRON 2 MG/ML
4 INJECTION INTRAMUSCULAR; INTRAVENOUS ONCE AS NEEDED
Status: DISCONTINUED | OUTPATIENT
Start: 2021-01-19 | End: 2021-01-19 | Stop reason: HOSPADM

## 2021-01-19 RX ORDER — PROPOFOL 10 MG/ML
INJECTION, EMULSION INTRAVENOUS AS NEEDED
Status: DISCONTINUED | OUTPATIENT
Start: 2021-01-19 | End: 2021-01-19

## 2021-01-19 RX ORDER — PROMETHAZINE HYDROCHLORIDE 25 MG/ML
12.5 INJECTION, SOLUTION INTRAMUSCULAR; INTRAVENOUS ONCE AS NEEDED
Status: DISCONTINUED | OUTPATIENT
Start: 2021-01-19 | End: 2021-01-19 | Stop reason: HOSPADM

## 2021-01-19 RX ORDER — LIDOCAINE HYDROCHLORIDE 10 MG/ML
INJECTION, SOLUTION EPIDURAL; INFILTRATION; INTRACAUDAL; PERINEURAL AS NEEDED
Status: DISCONTINUED | OUTPATIENT
Start: 2021-01-19 | End: 2021-01-19

## 2021-01-19 RX ORDER — FENTANYL CITRATE/PF 50 MCG/ML
25 SYRINGE (ML) INJECTION
Status: DISCONTINUED | OUTPATIENT
Start: 2021-01-19 | End: 2021-01-19 | Stop reason: HOSPADM

## 2021-01-19 RX ORDER — KETOROLAC TROMETHAMINE 30 MG/ML
INJECTION, SOLUTION INTRAMUSCULAR; INTRAVENOUS AS NEEDED
Status: DISCONTINUED | OUTPATIENT
Start: 2021-01-19 | End: 2021-01-19

## 2021-01-19 RX ORDER — SODIUM CHLORIDE, SODIUM LACTATE, POTASSIUM CHLORIDE, CALCIUM CHLORIDE 600; 310; 30; 20 MG/100ML; MG/100ML; MG/100ML; MG/100ML
100 INJECTION, SOLUTION INTRAVENOUS CONTINUOUS
Status: DISCONTINUED | OUTPATIENT
Start: 2021-01-19 | End: 2021-01-19 | Stop reason: HOSPADM

## 2021-01-19 RX ORDER — ONDANSETRON 2 MG/ML
INJECTION INTRAMUSCULAR; INTRAVENOUS AS NEEDED
Status: DISCONTINUED | OUTPATIENT
Start: 2021-01-19 | End: 2021-01-19

## 2021-01-19 RX ORDER — ACETAMINOPHEN 325 MG/1
650 TABLET ORAL EVERY 6 HOURS PRN
Status: DISCONTINUED | OUTPATIENT
Start: 2021-01-19 | End: 2021-01-19 | Stop reason: HOSPADM

## 2021-01-19 RX ORDER — IBUPROFEN 600 MG/1
600 TABLET ORAL EVERY 6 HOURS PRN
Status: DISCONTINUED | OUTPATIENT
Start: 2021-01-19 | End: 2021-01-19 | Stop reason: HOSPADM

## 2021-01-19 RX ORDER — SODIUM CHLORIDE, SODIUM LACTATE, POTASSIUM CHLORIDE, CALCIUM CHLORIDE 600; 310; 30; 20 MG/100ML; MG/100ML; MG/100ML; MG/100ML
75 INJECTION, SOLUTION INTRAVENOUS CONTINUOUS
Status: DISCONTINUED | OUTPATIENT
Start: 2021-01-19 | End: 2021-01-19 | Stop reason: HOSPADM

## 2021-01-19 RX ORDER — FENTANYL CITRATE 50 UG/ML
INJECTION, SOLUTION INTRAMUSCULAR; INTRAVENOUS AS NEEDED
Status: DISCONTINUED | OUTPATIENT
Start: 2021-01-19 | End: 2021-01-19

## 2021-01-19 RX ADMIN — FENTANYL CITRATE 50 MCG: 50 INJECTION INTRAMUSCULAR; INTRAVENOUS at 11:38

## 2021-01-19 RX ADMIN — PROPOFOL 150 MG: 10 INJECTION, EMULSION INTRAVENOUS at 11:26

## 2021-01-19 RX ADMIN — KETOROLAC TROMETHAMINE 30 MG: 30 INJECTION, SOLUTION INTRAMUSCULAR at 11:52

## 2021-01-19 RX ADMIN — LIDOCAINE HYDROCHLORIDE 0.5 ML: 10 INJECTION, SOLUTION EPIDURAL; INFILTRATION; INTRACAUDAL; PERINEURAL at 07:15

## 2021-01-19 RX ADMIN — ONDANSETRON 4 MG: 2 INJECTION INTRAMUSCULAR; INTRAVENOUS at 11:41

## 2021-01-19 RX ADMIN — LIDOCAINE HYDROCHLORIDE 50 MG: 10 INJECTION, SOLUTION EPIDURAL; INFILTRATION; INTRACAUDAL; PERINEURAL at 11:26

## 2021-01-19 RX ADMIN — SODIUM CHLORIDE, SODIUM LACTATE, POTASSIUM CHLORIDE, AND CALCIUM CHLORIDE 100 ML/HR: .6; .31; .03; .02 INJECTION, SOLUTION INTRAVENOUS at 07:14

## 2021-01-19 RX ADMIN — PROPOFOL 50 MG: 10 INJECTION, EMULSION INTRAVENOUS at 11:28

## 2021-01-19 NOTE — ANESTHESIA POSTPROCEDURE EVALUATION
Post-Op Assessment Note    CV Status:  Stable  Pain Score: 0    Pain management: adequate     Mental Status:  Alert and awake   Hydration Status:  Euvolemic   PONV Controlled:  Controlled   Airway Patency:  Patent      Post Op Vitals Reviewed: Yes      Staff: CRNA         No complications documented      BP   110/62   Temp 96 7   Pulse 63   Resp 12   SpO2 95% on room air

## 2021-01-19 NOTE — INTERVAL H&P NOTE
H&P reviewed  After examining the patient I find no changes in the patients condition since the H&P had been written      Vitals:    01/19/21 0704   BP: 108/71   Pulse: 88   Resp: 16   Temp: (!) 96 7 °F (35 9 °C)   SpO2: 98%

## 2021-01-19 NOTE — DISCHARGE INSTRUCTIONS
Dilation and Curettage   WHAT YOU SHOULD KNOW:   Dilation and curettage (D and C) is a procedure to remove tissue from the lining of your uterus  AFTER YOU LEAVE:   Medicines:   · Pain medicine: You may be given medicine to take away or decrease pain  Do not wait until the pain is severe before you take your medicine  · Antibiotics: This medicine will help fight or prevent an infection  · Take your medicine as directed  Call your healthcare provider if you think your medicine is not helping or if you have side effects  Tell him if you are allergic to any medicine  Keep a list of the medicines, vitamins, and herbs you take  Include the amounts, and when and why you take them  Bring the list or the pill bottles to follow-up visits  Carry your medicine list with you in case of an emergency  Follow up with your healthcare provider as directed:  Write down your questions so you remember to ask them during your visits  Activity:  Ask your primary healthcare provider when you can return to your normal activities  Contact your primary healthcare provider if:   · You have foul-smelling vaginal discharge  · You do not get your monthly period  · You feel depressed or anxious  · You feel very tired and weak  · You have questions or concerns about your condition or care  Seek care immediately or call 911 if:   · You have heavy vaginal bleeding that soaks 1 pad in 1 hour for 2 hours in a row  · You have a fever and abdominal cramps  · Your pain does not get better, even after treatment  © 2014 [employer] [employer] is for End User's use only and may not be sold, redistributed or otherwise used for commercial purposes  All illustrations and images included in CareNotes® are the copyrighted property of [employer] , [employer]  or [employer] Donal[employer]on  The above information is an  only  It is not intended as medical advice for individual conditions or treatments   Talk to your doctor, nurse or pharmacist before following any medical regimen to see if it is safe and effective for you

## 2021-01-19 NOTE — OP NOTE
OPERATIVE REPORT  PATIENT NAME: Андрей Conde    :  1995  MRN: [id number]  Pt Location:  OR ROOM 08    SURGERY DATE: 2021    Surgeon(s) and Role:     * Ervin Erickson MD - Primary     * Otis Serna MD - Assisting    Preop Diagnosis:  Abnormal uterine bleeding (AUB) [N93 9]  Retained placenta [O73 0]    Post-Op Diagnosis Codes:     * Abnormal uterine bleeding (AUB) [N93 9]     * Retained placenta [O73 0]    Procedure(s) (LRB):  DILATATION AND CURETTAGE (D&C) WITH HYSTEROSCOPY with  endometrial biopsy (N/A)    Specimen(s):  ID Type Source Tests Collected by Time Destination   1 : [facility] Tissue Endometrium TISSUE EXAM Ervin Ericskon MD 2021 [id number]        Estimated Blood Loss:   10cc    Drains:  none    Anesthesia Type:   General    Operative Indications:  Abnormal uterine bleeding (AUB) [N93 9]  Retained placenta [O73 0]    Operative Findings:  1  Normal appearing external genitalia, vaginal mucosa, and cervix  2  On hysteroscopy, evidence of largely normal appearing uterine cavity with the exception of fluffy tissue toward the right fundus  3  On removal of specimen, noted portions of thickened tissue extracted on curettage  Complications:   None    Procedure and Technique:  Patient was taken to the operating room  General LMA anesthesia (LMA) was administered and the patient was positioned on the OR table in the dorsal lithotomy position  All pressure points were padded and a tonia hugger was placed to maintain control of core body temperature  A bimanual exam was performed and the uterus was noted to be midposition, small in size and consistency with no palpable adnexal masses or fullness  The patient was prepped and draped in the usual sterile fashion  A time out was performed to confirm correct patient and correct procedure  A straight catheter was introduced into the bladder  Minimal urine drained as patient had voided just prior to OR   A weighted speculum was inserted into the vagina to visualize the anterior lip of the cervix, which was then grasped with a single toothed tenaculum  The uterus was sounded to 6cm  The cervix was serially dilated using Moon dilators for introduction of the hysteroscope  60 cc Tumi syringe was used to insert sterile saline into the uterus  Hysteroscope was immediately introduced afterward  Hysteroscope was advanced to the uterine fundus and the entire uterine cavity was inspected in a systematic manner  There was noted to be a largely normal appearing uterine cavity with the exception of fluffy tissue toward the right fundus  Hysteroscope was withdrawn and sharp curetting was performed, starting at the 12'oclock position and rotating a total of 360 degrees to cover all surfaces  Endometrial tissue was obtained and sent for pathology  Specimen was examined and there were noted to be portions of thickened, almost calcified tissue, correlating to TVUS findings  The single toothed tenaculum was removed from the anterior lip of the cervix  Good hemostasis was confirmed at the tenaculum puncture sites  Weighted speculum was then removed from the vagina  At the conclusion of the procedure, all needle, sponge, and instrument counts were noted to be correct x2  Dr Dae Moeller was present and participated in all key portions of the case      Patient Disposition:  PACU     SIGNATURE: Ritu Eastman MD  DATE: January 19, 2021  TIME: 12:01 PM

## 2021-01-20 ENCOUNTER — TELEPHONE (OUTPATIENT)
Dept: OBGYN CLINIC | Facility: CLINIC | Age: 26
End: 2021-01-20

## 2021-01-20 NOTE — TELEPHONE ENCOUNTER
ELENITAI:    Pt called to let you know she has very little bleeding after her surgery yesterday and she is feeling good

## 2021-02-03 ENCOUNTER — OFFICE VISIT (OUTPATIENT)
Dept: OBGYN CLINIC | Facility: CLINIC | Age: 26
End: 2021-02-03

## 2021-02-03 VITALS
DIASTOLIC BLOOD PRESSURE: 70 MMHG | SYSTOLIC BLOOD PRESSURE: 110 MMHG | BODY MASS INDEX: 26.53 KG/M2 | WEIGHT: 169 LBS | HEIGHT: 67 IN

## 2021-02-03 DIAGNOSIS — Z48.89 POSTOPERATIVE VISIT: Primary | ICD-10-CM

## 2021-02-03 PROCEDURE — 99024 POSTOP FOLLOW-UP VISIT: CPT | Performed by: OBSTETRICS & GYNECOLOGY

## 2021-02-03 NOTE — PROGRESS NOTES
The patient is here for a post-op  She had a D&C hysteroscopy with endometrial biopsy on 1/19/2021  No bleeding or cramping currently

## 2021-02-03 NOTE — PROGRESS NOTES
Patient returns to the office following a hysteroscopy, D&C for retained placenta and continuous vaginal bleeding following VSD  Patient's bleeding completely resolved  Patient denies any pain  Pathology reviewed  Impression:  Stable status post hysteroscopy D&C for small retained placenta  Plan:  Continue breastfeeding  Observe for any abnormal bleeding    Return to the office in October 2021 for yearly exam

## 2021-10-21 ENCOUNTER — OFFICE VISIT (OUTPATIENT)
Dept: OBGYN CLINIC | Facility: CLINIC | Age: 26
End: 2021-10-21
Payer: COMMERCIAL

## 2021-10-21 VITALS
HEIGHT: 69 IN | SYSTOLIC BLOOD PRESSURE: 110 MMHG | WEIGHT: 162 LBS | BODY MASS INDEX: 23.99 KG/M2 | DIASTOLIC BLOOD PRESSURE: 78 MMHG

## 2021-10-21 DIAGNOSIS — Z01.419 ROUTINE GYNECOLOGICAL EXAMINATION: Primary | ICD-10-CM

## 2021-10-21 DIAGNOSIS — Z01.419 ENCOUNTER FOR ANNUAL ROUTINE GYNECOLOGICAL EXAMINATION: ICD-10-CM

## 2021-10-21 PROCEDURE — S0612 ANNUAL GYNECOLOGICAL EXAMINA: HCPCS | Performed by: OBSTETRICS & GYNECOLOGY

## 2021-10-26 LAB — THIN PREP CVX: NORMAL

## 2022-11-02 ENCOUNTER — ANNUAL EXAM (OUTPATIENT)
Dept: GYNECOLOGY | Facility: CLINIC | Age: 27
End: 2022-11-02

## 2022-11-02 VITALS
DIASTOLIC BLOOD PRESSURE: 70 MMHG | SYSTOLIC BLOOD PRESSURE: 110 MMHG | HEIGHT: 67 IN | BODY MASS INDEX: 26.21 KG/M2 | WEIGHT: 167 LBS

## 2022-11-02 DIAGNOSIS — Z01.419 ENCOUNTER FOR ANNUAL ROUTINE GYNECOLOGICAL EXAMINATION: Primary | ICD-10-CM

## 2022-11-02 NOTE — PROGRESS NOTES
Assessment/Plan:  Normal breast and gyn exam  Normal Pap smear 2021  Regular menstrual cycles  COVID vaccine in booster    Plan:  Continue healthy diet exercise  Continue prenatal vitamins  Subjective: ()     Patient ID: Stephan Jordan is a 28 y o  female presents for annual exam no complaints  Patient has regular menstrual cycles  Open to pregnancy  Taking her prenatal vitamins  Denies any pelvic pain breast bowel or bladder issues  No change in family history  Paternal grandmother (breast cancer)  Her boys are now 3and 3years old  Review of Systems   Constitutional: Negative  Negative for fatigue, fever and unexpected weight change  HENT: Negative  Eyes: Negative  Respiratory: Negative  Negative for chest tightness, shortness of breath, wheezing and stridor  Cardiovascular: Negative  Negative for chest pain, palpitations and leg swelling  Gastrointestinal: Negative  Negative for abdominal pain, blood in stool, diarrhea, nausea, rectal pain and vomiting  Endocrine: Negative  Genitourinary: Negative for dysuria, frequency, vaginal bleeding, vaginal discharge and vaginal pain  Musculoskeletal: Negative  Skin: Negative  Allergic/Immunologic: Negative  Neurological: Negative  Hematological: Negative  Psychiatric/Behavioral: Negative  All other systems reviewed and are negative  Objective:      /70   Ht 5' 7" (1 702 m)   Wt 75 8 kg (167 lb)   LMP 10/19/2022 (Exact Date)   BMI 26 16 kg/m²          Physical Exam  Constitutional:       Appearance: She is well-developed  HENT:      Head: Normocephalic and atraumatic  Neck:      Thyroid: No thyromegaly  Trachea: No tracheal deviation  Cardiovascular:      Rate and Rhythm: Normal rate and regular rhythm  Heart sounds: Normal heart sounds  Pulmonary:      Effort: Pulmonary effort is normal  No respiratory distress  Breath sounds: Normal breath sounds  No stridor  No wheezing or rales  Chest:      Chest wall: No tenderness  Breasts: Breasts are symmetrical       Right: No inverted nipple, mass, nipple discharge, skin change or tenderness  Left: No inverted nipple, mass, nipple discharge, skin change or tenderness  Abdominal:      General: Bowel sounds are normal  There is no distension  Palpations: Abdomen is soft  There is no mass  Tenderness: There is no abdominal tenderness  There is no guarding or rebound  Hernia: No hernia is present  There is no hernia in the left inguinal area  Genitourinary:     Labia:         Right: No rash, tenderness, lesion or injury  Left: No rash, tenderness, lesion or injury  Vagina: Normal  No signs of injury and foreign body  No vaginal discharge, erythema, tenderness or bleeding  Cervix: No cervical motion tenderness, discharge or friability  Uterus: Not deviated, not enlarged, not fixed and not tender  Adnexa:         Right: No mass, tenderness or fullness  Left: No mass, tenderness or fullness  Rectum: No mass, anal fissure, external hemorrhoid or internal hemorrhoid  Musculoskeletal:      Cervical back: Normal range of motion and neck supple  Lymphadenopathy:      Lower Body: No right inguinal adenopathy  No left inguinal adenopathy  Skin:     General: Skin is warm and dry  Neurological:      Mental Status: She is alert and oriented to person, place, and time  Psychiatric:         Behavior: Behavior normal          Thought Content:  Thought content normal          Judgment: Judgment normal

## 2023-11-16 ENCOUNTER — ANNUAL EXAM (OUTPATIENT)
Dept: GYNECOLOGY | Facility: CLINIC | Age: 28
End: 2023-11-16
Payer: COMMERCIAL

## 2023-11-16 VITALS
SYSTOLIC BLOOD PRESSURE: 124 MMHG | HEIGHT: 67 IN | BODY MASS INDEX: 25.11 KG/M2 | DIASTOLIC BLOOD PRESSURE: 78 MMHG | WEIGHT: 160 LBS

## 2023-11-16 DIAGNOSIS — Z01.419 ENCOUNTER FOR GYNECOLOGICAL EXAMINATION WITHOUT ABNORMAL FINDING: Primary | ICD-10-CM

## 2023-11-16 PROCEDURE — 99395 PREV VISIT EST AGE 18-39: CPT | Performed by: OBSTETRICS & GYNECOLOGY

## 2023-11-16 NOTE — PROGRESS NOTES
Assessment/Plan:      Normal breast and GYN exam  Regular menstrual cycles  Normal Pap smear     Plan: Continue healthy diet and exercise. Recommend daily Kegels. Subjective: G2, P2      Patient ID: Lamond Severance is a 33 y.o. female presents to the office for annual exam with no complaints. Patient has regular menstrual cycles and denies any abnormal bleeding pelvic pain or dyspareunia. Denies any breast bowel or bladder problems. No change in family history. Paternal grandmother (breast cancer). Eating healthy and exercising regularly. Takes classes and martial arts. Working full-time at [address]. Review of Systems   Constitutional: Negative. Negative for fatigue, fever and unexpected weight change. HENT: Negative. Eyes: Negative. Respiratory: Negative. Negative for chest tightness, shortness of breath, wheezing and stridor. Cardiovascular: Negative. Negative for chest pain, palpitations and leg swelling. Gastrointestinal: Negative. Negative for abdominal pain, blood in stool, diarrhea, nausea, rectal pain and vomiting. Endocrine: Negative. Genitourinary:  Negative for dysuria, frequency, vaginal bleeding, vaginal discharge and vaginal pain. Musculoskeletal: Negative. Skin: Negative. Allergic/Immunologic: Negative. Neurological: Negative. Hematological: Negative. Psychiatric/Behavioral: Negative. All other systems reviewed and are negative. Objective:       /78   Ht 5' 7" (1.702 m)   Wt 72.6 kg (160 lb)   LMP 10/19/2023 (Exact Date)   BMI 25.06 kg/m²          Physical Exam  Constitutional:       Appearance: She is well-developed. HENT:      Head: Normocephalic and atraumatic. Neck:      Thyroid: No thyromegaly. Trachea: No tracheal deviation. Cardiovascular:      Rate and Rhythm: Normal rate and regular rhythm. Heart sounds: Normal heart sounds.    Pulmonary:      Effort: Pulmonary effort is normal. No respiratory distress. Breath sounds: Normal breath sounds. No stridor. No wheezing or rales. Chest:      Chest wall: No tenderness. Breasts:     Breasts are symmetrical.      Right: No inverted nipple, mass, nipple discharge, skin change or tenderness. Left: No inverted nipple, mass, nipple discharge, skin change or tenderness. Abdominal:      General: Bowel sounds are normal. There is no distension. Palpations: Abdomen is soft. There is no mass. Tenderness: There is no abdominal tenderness. There is no guarding or rebound. Hernia: No hernia is present. There is no hernia in the left inguinal area. Genitourinary:     Labia:         Right: No rash, tenderness, lesion or injury. Left: No rash, tenderness, lesion or injury. Vagina: Normal. No signs of injury and foreign body. No vaginal discharge, erythema, tenderness or bleeding. Cervix: No cervical motion tenderness, discharge or friability. Uterus: Not deviated, not enlarged, not fixed and not tender. Adnexa:         Right: No mass, tenderness or fullness. Left: No mass, tenderness or fullness. Rectum: No mass, anal fissure, external hemorrhoid or internal hemorrhoid. Musculoskeletal:      Cervical back: Normal range of motion and neck supple. Lymphadenopathy:      Lower Body: No right inguinal adenopathy. No left inguinal adenopathy. Skin:     General: Skin is warm and dry. Neurological:      Mental Status: She is alert and oriented to person, place, and time. Psychiatric:         Behavior: Behavior normal.         Thought Content:  Thought content normal.         Judgment: Judgment normal.

## 2024-11-18 ENCOUNTER — ANNUAL EXAM (OUTPATIENT)
Dept: GYNECOLOGY | Facility: CLINIC | Age: 29
End: 2024-11-18
Payer: COMMERCIAL

## 2024-11-18 VITALS
WEIGHT: 162 LBS | SYSTOLIC BLOOD PRESSURE: 122 MMHG | BODY MASS INDEX: 25.43 KG/M2 | DIASTOLIC BLOOD PRESSURE: 80 MMHG | HEIGHT: 67 IN

## 2024-11-18 DIAGNOSIS — Z01.419 ROUTINE GYNECOLOGICAL EXAMINATION: Primary | ICD-10-CM

## 2024-11-18 DIAGNOSIS — Z11.51 SCREENING FOR HPV (HUMAN PAPILLOMAVIRUS): ICD-10-CM

## 2024-11-18 PROCEDURE — 99395 PREV VISIT EST AGE 18-39: CPT | Performed by: OBSTETRICS & GYNECOLOGY

## 2024-11-18 NOTE — PROGRESS NOTES
"Name: Tamera Hernandez      : 1995      MRN: [id number]  Encounter Provider: Arnulfo Gar MD  Encounter Date: 2024   Encounter department: [facility]  :  Assessment & Plan    Normal breast and GYN exam  Normal Pap smear         Plan: Check Pap smear.  Recommend daily Kegels.  Continue healthy diet and exercise.    G2, P2     Tamera Hernandez is a 29 y.o. female who presents exam no complaints.  Has regular menstrual cycles and denies any pelvic pain or dyspareunia.  Denies any breast bowel or bladder problems.  No change in family history.  Medications reviewed.  Patient exercises through martial arts.  Eating healthy.  Working full-time for [employer].  Children are doing well.          Objective   /80   Ht 5' 7\" (1.702 m)   Wt 73.5 kg (162 lb)   LMP 2024 (Exact Date)   BMI 25.37 kg/m²      Physical Exam  Vitals and nursing note reviewed.   Constitutional:       General: She is not in acute distress.     Appearance: She is well-developed.   HENT:      Head: Normocephalic and atraumatic.   Eyes:      Conjunctiva/sclera: Conjunctivae normal.   Cardiovascular:      Rate and Rhythm: Normal rate and regular rhythm.      Heart sounds: No murmur heard.  Pulmonary:      Effort: Pulmonary effort is normal. No respiratory distress.      Breath sounds: Normal breath sounds.   Chest:   Breasts:     Right: Normal.      Left: Normal.   Abdominal:      Palpations: Abdomen is soft.      Tenderness: There is no abdominal tenderness.   Genitourinary:     Vagina: Normal.      Cervix: Normal.      Uterus: Normal.       Adnexa: Right adnexa normal and left adnexa normal.      Comments:  External genitalia normal.  No uterine prolapse cystocele rectocele.  Normal urethra and [place] glands.  Moderate muscle tone on Kegel attempt.  Musculoskeletal:         General: No swelling.      Cervical back: Neck supple.   Skin:     General: Skin is warm and dry.      " Capillary Refill: Capillary refill takes less than 2 seconds.   Neurological:      Mental Status: She is alert.   Psychiatric:         Mood and Affect: Mood normal.

## 2024-12-03 ENCOUNTER — RESULTS FOLLOW-UP (OUTPATIENT)
Age: 29
End: 2024-12-03

## (undated) DEVICE — STOPCOCK 4-WAY

## (undated) DEVICE — TUBING SUCTION 5MM X 12 FT

## (undated) DEVICE — IV SET 15 DROP STERILE 0/Y GRAVITY

## (undated) DEVICE — GLOVE PI ULTRA TOUCH SZ.7.5

## (undated) DEVICE — MAYO STAND COVER: Brand: CONVERTORS

## (undated) DEVICE — Device: Brand: MEDEX

## (undated) DEVICE — BETHLEHEM UNIVERSAL MINOR VAG: Brand: CARDINAL HEALTH

## (undated) DEVICE — PREMIUM DRY TRAY LF: Brand: MEDLINE INDUSTRIES, INC.

## (undated) DEVICE — SYRINGE 50ML LL